# Patient Record
Sex: FEMALE | Race: WHITE | NOT HISPANIC OR LATINO | Employment: FULL TIME | URBAN - NONMETROPOLITAN AREA
[De-identification: names, ages, dates, MRNs, and addresses within clinical notes are randomized per-mention and may not be internally consistent; named-entity substitution may affect disease eponyms.]

---

## 2017-03-07 ENCOUNTER — HOSPITAL ENCOUNTER (EMERGENCY)
Facility: HOSPITAL | Age: 37
Discharge: HOME | End: 2017-03-07
Attending: EMERGENCY MEDICINE | Admitting: EMERGENCY MEDICINE

## 2017-03-07 VITALS
OXYGEN SATURATION: 99 % | SYSTOLIC BLOOD PRESSURE: 130 MMHG | DIASTOLIC BLOOD PRESSURE: 95 MMHG | RESPIRATION RATE: 19 BRPM | WEIGHT: 210 LBS | TEMPERATURE: 97.8 F | HEART RATE: 75 BPM | BODY MASS INDEX: 31.1 KG/M2 | HEIGHT: 69 IN

## 2017-03-07 DIAGNOSIS — R19.7 DIARRHEA, UNSPECIFIED TYPE: Primary | ICD-10-CM

## 2017-03-07 PROCEDURE — 99282 EMERGENCY DEPT VISIT SF MDM: CPT

## 2017-03-08 NOTE — ED PROVIDER NOTES
Subjective   History of Present Illness   36-year-old female with a remote history of a bladder cancer status post resection presenting with 1 week of diarrhea.  Patient states that she had frequent loose stools of the last week that resolved until last night when she took a dose of probiotics.  Then, this morning she started having loose stools again.  Had 7-8 loose stools during the day with no associated fever, chills, nausea, vomiting, abdominal pain.  Denies any recent travel, well water, antibiotics use, hospitalizations, immunotherapy.  Has taken intermittent doses of Imodium throughout the course of this illness.  Tolerating fluids without difficulty.    Review of Systems   Gastrointestinal: Positive for diarrhea.   All other systems reviewed and are negative.      Past Medical History   Diagnosis Date   • ADHD (attention deficit hyperactivity disorder)    • FHx: chemotherapy    • Gallbladder cancer        Allergies   Allergen Reactions   • Ciprofloxacin Rash   • Penicillins Rash       Past Surgical History   Procedure Laterality Date   • Liver resection     •  section     • Cholecystectomy     • Portacath placement         History reviewed. No pertinent family history.    Social History     Social History   • Marital status:      Spouse name: N/A   • Number of children: N/A   • Years of education: N/A     Social History Main Topics   • Smoking status: Never Smoker   • Smokeless tobacco: None   • Alcohol use No   • Drug use: No   • Sexual activity: Defer     Other Topics Concern   • None     Social History Narrative   • None           Objective   Physical Exam   Constitutional: She is oriented to person, place, and time. She appears well-nourished. No distress.   HENT:   Head: Normocephalic.   Mouth/Throat: Oropharynx is clear and moist. No oropharyngeal exudate.   Eyes: Conjunctivae are normal. No scleral icterus.   Neck: Neck supple. No tracheal deviation present.   Cardiovascular: Normal  rate, regular rhythm, normal heart sounds and intact distal pulses.  Exam reveals no gallop and no friction rub.    No murmur heard.  Pulmonary/Chest: Effort normal and breath sounds normal. No stridor. No respiratory distress. She has no wheezes. She has no rales. She exhibits no tenderness.   Abdominal: Soft. She exhibits no distension and no mass. There is no tenderness. There is no rebound and no guarding. No hernia.   Musculoskeletal: She exhibits no edema or deformity.   Neurological: She is alert and oriented to person, place, and time.   Skin: Skin is warm and dry. She is not diaphoretic. No erythema. No pallor.   Psychiatric: She has a normal mood and affect. Her behavior is normal.   Nursing note and vitals reviewed.      Procedures         ED Course  ED Course                  MDM   36-year-old female here with 1 week of diarrhea.  Afebrile, vital signs stable.  Abdomen is benign.  She does not appear dehydrated nor ill. Very low suspicion for Clostridium difficile or acute surgical process. Discussed use of Imodium more regularly, follow up with primary care provider for reevaluation, and strict return to care precautions if she is unable to tolerate by mouth fluid.      Final diagnoses:   Diarrhea, unspecified type            Jordin Moffett MD  03/07/17 5738

## 2017-03-08 NOTE — DISCHARGE INSTRUCTIONS
Please restart Imodium.  Come back to the emergency department if you're unable to keep down any fluids, you feel abdominal pain, or you have other concerns.

## 2017-03-11 ENCOUNTER — HOSPITAL ENCOUNTER (EMERGENCY)
Facility: HOSPITAL | Age: 37
Discharge: LEFT BEFORE DISCHARGE | End: 2017-03-11

## 2017-03-11 VITALS
HEART RATE: 94 BPM | HEIGHT: 69 IN | SYSTOLIC BLOOD PRESSURE: 140 MMHG | TEMPERATURE: 97.4 F | BODY MASS INDEX: 30.51 KG/M2 | RESPIRATION RATE: 18 BRPM | OXYGEN SATURATION: 100 % | WEIGHT: 206 LBS | DIASTOLIC BLOOD PRESSURE: 98 MMHG

## 2017-03-11 PROCEDURE — 99201: CPT

## 2017-04-12 ENCOUNTER — APPOINTMENT (OUTPATIENT)
Dept: GENERAL RADIOLOGY | Facility: HOSPITAL | Age: 37
End: 2017-04-12

## 2017-04-12 ENCOUNTER — HOSPITAL ENCOUNTER (EMERGENCY)
Facility: HOSPITAL | Age: 37
Discharge: HOME | End: 2017-04-12
Attending: EMERGENCY MEDICINE | Admitting: EMERGENCY MEDICINE

## 2017-04-12 VITALS
SYSTOLIC BLOOD PRESSURE: 135 MMHG | DIASTOLIC BLOOD PRESSURE: 96 MMHG | TEMPERATURE: 97.7 F | BODY MASS INDEX: 30.31 KG/M2 | RESPIRATION RATE: 18 BRPM | OXYGEN SATURATION: 100 % | HEIGHT: 68 IN | WEIGHT: 200 LBS | HEART RATE: 80 BPM

## 2017-04-12 DIAGNOSIS — S92.901A FOOT FRACTURE, RIGHT, CLOSED, INITIAL ENCOUNTER: Primary | ICD-10-CM

## 2017-04-12 PROCEDURE — 73630 X-RAY EXAM OF FOOT: CPT

## 2017-04-12 PROCEDURE — 99283 EMERGENCY DEPT VISIT LOW MDM: CPT

## 2017-04-12 PROCEDURE — 73610 X-RAY EXAM OF ANKLE: CPT

## 2017-04-12 RX ORDER — IBUPROFEN 600 MG/1
600 TABLET ORAL EVERY 8 HOURS PRN
Qty: 12 TABLET | Refills: 0 | Status: SHIPPED | OUTPATIENT
Start: 2017-04-12 | End: 2018-05-14

## 2017-04-12 RX ORDER — ACETAMINOPHEN 325 MG/1
650 TABLET ORAL ONCE
Status: COMPLETED | OUTPATIENT
Start: 2017-04-12 | End: 2017-04-12

## 2017-04-12 RX ORDER — HYDROCODONE BITARTRATE AND ACETAMINOPHEN 5; 325 MG/1; MG/1
1 TABLET ORAL EVERY 6 HOURS PRN
Qty: 12 TABLET | Refills: 0 | Status: SHIPPED | OUTPATIENT
Start: 2017-04-12 | End: 2018-05-03

## 2017-04-12 RX ORDER — IBUPROFEN 600 MG/1
600 TABLET ORAL ONCE
Status: COMPLETED | OUTPATIENT
Start: 2017-04-12 | End: 2017-04-12

## 2017-04-12 RX ADMIN — IBUPROFEN 600 MG: 600 TABLET, FILM COATED ORAL at 10:41

## 2017-04-12 RX ADMIN — ACETAMINOPHEN 650 MG: 325 TABLET, FILM COATED ORAL at 10:41

## 2017-04-12 NOTE — ED PROVIDER NOTES
Subjective   HPI Comments: Patient here with complaint of right foot pain twisted earlier today describes injuring it 5 days ago as well no numbness tingling no other injuries presents here for further evaluation localized pain right foot      Review of Systems   Constitutional: Negative.    HENT: Negative.    Gastrointestinal: Negative.    Musculoskeletal: Positive for arthralgias.   Skin: Negative.    Neurological: Negative.    Psychiatric/Behavioral: Negative.    All other systems reviewed and are negative.      Past Medical History:   Diagnosis Date   • ADHD (attention deficit hyperactivity disorder)    • FHx: chemotherapy    • Gallbladder cancer        Allergies   Allergen Reactions   • Ciprofloxacin Rash   • Penicillins Rash       Past Surgical History:   Procedure Laterality Date   •  SECTION     • CHOLECYSTECTOMY     • LIVER RESECTION     • PORTACATH PLACEMENT         History reviewed. No pertinent family history.    Social History     Social History   • Marital status:      Spouse name: N/A   • Number of children: N/A   • Years of education: N/A     Social History Main Topics   • Smoking status: Former Smoker   • Smokeless tobacco: None      Comment: E-cig, quit smoking 10 yr ago.   • Alcohol use No   • Drug use: No   • Sexual activity: Defer     Other Topics Concern   • None     Social History Narrative   • None           Objective   Physical Exam   Constitutional: She is oriented to person, place, and time. She appears well-developed and well-nourished.   Afebrile vital signs stable nontoxic well-appearing   HENT:   Head: Normocephalic.   Eyes: Pupils are equal, round, and reactive to light.   Neck: Neck supple.   Cardiovascular: Normal rate and intact distal pulses.    Pulmonary/Chest: Effort normal.   Musculoskeletal: She exhibits no deformity.   Tenderness dorsal lateral aspect right foot slight tenderness lateral malleolus no deformity no bruising no vascular intact   Neurological: She  is alert and oriented to person, place, and time. No cranial nerve deficit.   Skin: Skin is warm and dry. No rash noted.   Psychiatric: She has a normal mood and affect. Her behavior is normal.   Nursing note and vitals reviewed.      Splint Application  Date/Time: 4/12/2017 11:10 AM  Performed by: KEVIN AGUILAR  Authorized by: KWASI GRAHAM   Consent: Verbal consent obtained.  Consent given by: patient  Patient identity confirmed: verbally with patient  Location details: right ankle  Splint type: cast boot.  Post-procedure: The splinted body part was neurovascularly unchanged following the procedure.  Patient tolerance: Patient tolerated the procedure well with no immediate complications               ED Course  ED Course   Comment By Time   Will place patient and walking cast boot until orthopedic follow-up Kevin Aguilar PA-C 04/12 1105                  [facility]    Final diagnoses:   Foot fracture, right, closed, initial encounter            Kevin Aguilar PA-C  04/12/17 1111

## 2017-04-13 ENCOUNTER — OFFICE VISIT (OUTPATIENT)
Dept: ORTHOPEDIC SURGERY | Facility: CLINIC | Age: 37
End: 2017-04-13

## 2017-04-13 VITALS — BODY MASS INDEX: 30.31 KG/M2 | HEIGHT: 68 IN | WEIGHT: 200 LBS | RESPIRATION RATE: 18 BRPM

## 2017-04-13 DIAGNOSIS — S92.354A CLOSED NONDISPLACED FRACTURE OF FIFTH METATARSAL BONE OF RIGHT FOOT, INITIAL ENCOUNTER: Primary | ICD-10-CM

## 2017-04-13 PROCEDURE — 99204 OFFICE O/P NEW MOD 45 MIN: CPT | Performed by: PODIATRIST

## 2017-04-13 RX ORDER — ALPRAZOLAM 0.5 MG/1
TABLET ORAL
Refills: 0 | COMMUNITY
Start: 2017-04-12 | End: 2018-05-03

## 2017-04-13 RX ORDER — OSELTAMIVIR PHOSPHATE 75 MG/1
CAPSULE ORAL
Refills: 0 | COMMUNITY
Start: 2017-03-16 | End: 2018-05-03

## 2017-04-13 RX ORDER — METHYLPHENIDATE HYDROCHLORIDE 20 MG/1
TABLET ORAL
Refills: 0 | COMMUNITY
Start: 2017-03-16 | End: 2018-05-03

## 2017-04-13 RX ORDER — FUROSEMIDE 20 MG/1
TABLET ORAL
Refills: 0 | COMMUNITY
Start: 2017-04-11

## 2017-04-13 RX ORDER — HYDROCODONE BITARTRATE AND ACETAMINOPHEN 5; 325 MG/1; MG/1
1 TABLET ORAL EVERY 6 HOURS PRN
Qty: 20 TABLET | Refills: 0 | Status: SHIPPED | OUTPATIENT
Start: 2017-04-13 | End: 2018-05-03

## 2017-04-13 RX ORDER — BUPROPION HYDROCHLORIDE 300 MG/1
300 TABLET ORAL EVERY MORNING
Refills: 0 | COMMUNITY
Start: 2017-02-10

## 2017-04-13 NOTE — PROGRESS NOTES
Subjective   Patient ID: Pao Modi is a 36 y.o. female   Pain of the Right Foot  she comes in today after being seen in the ed for a roberts fracture of the right foot she says.  She says she tripped at home the other day and heard something crack or pop. She says she think she hurt it the weekend before this as well.  History of Present Illness    Review of Systems   Constitutional: Negative for fever.   HENT: Negative for voice change.    Eyes: Negative for visual disturbance.   Respiratory: Negative for shortness of breath.    Cardiovascular: Negative for chest pain.   Gastrointestinal: Negative for abdominal distention and abdominal pain.   Genitourinary: Negative for dysuria.   Musculoskeletal: Positive for arthralgias. Negative for gait problem and joint swelling.   Skin: Negative for rash.   Neurological: Negative for speech difficulty.   Hematological: Does not bruise/bleed easily.   Psychiatric/Behavioral: Negative for confusion.       Past Medical History:   Diagnosis Date   • ADHD (attention deficit hyperactivity disorder)    • FHx: chemotherapy    • Gallbladder cancer         Past Surgical History:   Procedure Laterality Date   •  SECTION     • CHOLECYSTECTOMY     • LIVER RESECTION     • PORTACATH PLACEMENT         Allergies   Allergen Reactions   • Ciprofloxacin Rash   • Penicillins Rash       @FH/ reviewed@    Objective   Physical Exam   Constitutional: She is oriented to person, place, and time. She appears well-developed and well-nourished.   HENT:   Head: Normocephalic and atraumatic.   Eyes: EOM are normal. Pupils are equal, round, and reactive to light.   Neck: Normal range of motion.   Cardiovascular: Normal rate.    Pulmonary/Chest: Effort normal.   Abdominal: Soft. Bowel sounds are normal.   Musculoskeletal: Normal range of motion.   Neurological: She is alert and oriented to person, place, and time. She has normal reflexes.   Skin: Skin is warm.   Psychiatric: She has a normal  mood and affect. Her behavior is normal. Judgment and thought content normal.     Ortho Exam  Ortho Exam   Right Lower extremity exam:  Vascular: Pedal pulses are palpable, pedal hair is noted, edema is noted to the lateral right foot.  Neuro: Gross sensation and reflexes are normal and intact  Derm: There is edema, erythema, ecchymosis over the dorsal lateral foot in the area of the fifth metatarsal base.  MSK: She is tender to palpation along the lateral aspect of the foot especially along the proximal fifth metatarsal.    Assessment/Plan  fifth metatarsal fracture foot right  Independent Review of Radiographic Studies:    I reviewed her x-rays from the emergency department which show a transverse fracture of the fifth metatarsal at the proximal shaft.  It's just past the metaphyseal diaphyseal junction and to me is just too distal to be called a true Montoya fracture.  The fracture also does not seem to bridge all the way across the medial cortex.  It's otherwise nondisplaced.  Laboratory and Other Studies:     Medical Decision Making:        Procedures  [] No procedures were performed in office today.    Pao was seen today for pain.    Diagnoses and all orders for this visit:    Closed nondisplaced fracture of fifth metatarsal bone of right foot, initial encounter    Other orders  -     HYDROcodone-acetaminophen (NORCO) 5-325 MG per tablet; Take 1 tablet by mouth Every 6 (Six) Hours As Needed (pain).        Recommendations/Plan:  I discussed with her the fracture as well as some it's potential difficulty with healing.  I explained though that due to it not being completely through both cortices and its alignment as well as in my personal opinion it not being a true Montoya fracture I think we may be okay treating it him in just a boot.  I did discuss with her ever though that it could result in delayed or nonunion and she could potentially need surgery later.  For now recommend a Rice to help with her edema.  She  can weight-bear as tolerated in the boot and use her crutches as needed.  Her oldest son is getting  this weekend and I advised her not to be up on it too much for that.  At her request I gave her a a few additional Norco but advised her that since she just From the Emergency Department Yesterday That May Not Fill It until Those Are Finished.  I'll See Her Back in about 2 or 3 Weeks to Monitor X-Rays.    Return in about 2 weeks (around 4/27/2017), or xoa.  Patient agreeable to call or return sooner for any concerns.

## 2017-04-26 DIAGNOSIS — Z09 FOLLOW-UP EXAM: Primary | ICD-10-CM

## 2017-04-27 ENCOUNTER — OFFICE VISIT (OUTPATIENT)
Dept: ORTHOPEDIC SURGERY | Facility: CLINIC | Age: 37
End: 2017-04-27

## 2017-04-27 VITALS — BODY MASS INDEX: 30.31 KG/M2 | HEIGHT: 68 IN | WEIGHT: 200 LBS | RESPIRATION RATE: 16 BRPM

## 2017-04-27 DIAGNOSIS — S92.354A CLOSED NONDISPLACED FRACTURE OF FIFTH METATARSAL BONE OF RIGHT FOOT, INITIAL ENCOUNTER: Primary | ICD-10-CM

## 2017-04-27 PROCEDURE — 99213 OFFICE O/P EST LOW 20 MIN: CPT | Performed by: PODIATRIST

## 2017-04-27 NOTE — PROGRESS NOTES
Subjective   Patient ID: Pao Modi is a 36 y.o. female   No chief complaint on file.  Comes back in today in her boot and 4 follow-up for her right foot fracture.  She states is starting to feel better but she's also says she's been walking on it at home some without the boot.  She says she's been using some sports tape and some compression and pressure on it makes it feel better.    History of Present Illness    Review of Systems   Constitutional: Negative for diaphoresis, fever and unexpected weight change.   HENT: Negative for dental problem and sore throat.    Eyes: Negative for visual disturbance.   Respiratory: Negative for shortness of breath.    Cardiovascular: Negative for chest pain.   Gastrointestinal: Negative for abdominal pain, constipation, diarrhea, nausea and vomiting.   Genitourinary: Negative for difficulty urinating and frequency.   Neurological: Negative for headaches.   Hematological: Does not bruise/bleed easily.   All other systems reviewed and are negative.      Past Medical History:   Diagnosis Date   • ADHD (attention deficit hyperactivity disorder)    • FHx: chemotherapy    • Gallbladder cancer         Past Surgical History:   Procedure Laterality Date   •  SECTION     • CHOLECYSTECTOMY     • LIVER RESECTION     • PORTACATH PLACEMENT         Allergies   Allergen Reactions   • Ciprofloxacin Rash   • Penicillins Rash         Objective   Physical Exam   Constitutional: She is oriented to person, place, and time. She appears well-developed and well-nourished.   HENT:   Head: Normocephalic and atraumatic.   Eyes: EOM are normal. Pupils are equal, round, and reactive to light.   Neck: Normal range of motion.   Cardiovascular: Normal rate.    Pulmonary/Chest: Effort normal.   Abdominal: Soft. Bowel sounds are normal.   Musculoskeletal: Normal range of motion.   Neurological: She is alert and oriented to person, place, and time. She has normal reflexes.   Skin: Skin is warm.    Psychiatric: She has a normal mood and affect. Her behavior is normal. Judgment and thought content normal.     Ortho Exam  Ortho Exam  Right Lower extremity exam:  Vascular: Pulses are palpable, pedal hair is noted, capillary refill time is brisk, there is mild edema to the right foot.  Neuro: Gross sensations intact.  Reflexes intact.  Derm: Mild bruising and ecchymosis to the right lateral foot.  MSK: She still maintains a cavovarus foot type and a C-shaped foot.  She  to palpation along the fifth metatarsal base.    Assessment/Plan right fifth metatarsal Montoya fracture.  Independent Review of Radiographic Studies:      Laboratory and Other Studies:     Medical Decision Making:        Procedures  [] No procedures were performed in office today.    There are no diagnoses linked to this encounter.      Recommendations/Plan:  I reviewed her x-rays and discussed the findings with her.  Explained that there does appear to be some callus forming across the fracture but is still not healed.  She needs to continue weightbearing in the boot.  Continue Rice as needed.  She can use her physical therapy tape as she wishes.  I cautioned her against walking around without the boot and explained she could reinjure this and potentially result in needing surgery.  I'll see her back in 2 or 3 weeks for follow-up x-rays and maybe at that point we'll consider transition to some partial shoe wear and the house.    No Follow-up on file.  Patient agreeable to call or return sooner for any concerns.

## 2017-05-12 DIAGNOSIS — Z09 FOLLOW-UP EXAM: Primary | ICD-10-CM

## 2017-05-16 ENCOUNTER — OFFICE VISIT (OUTPATIENT)
Dept: ORTHOPEDIC SURGERY | Facility: CLINIC | Age: 37
End: 2017-05-16

## 2017-05-16 VITALS — RESPIRATION RATE: 18 BRPM | BODY MASS INDEX: 30.31 KG/M2 | WEIGHT: 200 LBS | HEIGHT: 68 IN

## 2017-05-16 DIAGNOSIS — S92.351D CLOSED DISPLACED FRACTURE OF FIFTH METATARSAL BONE OF RIGHT FOOT WITH ROUTINE HEALING, SUBSEQUENT ENCOUNTER: Primary | ICD-10-CM

## 2017-05-16 PROCEDURE — 99213 OFFICE O/P EST LOW 20 MIN: CPT | Performed by: PODIATRIST

## 2017-06-07 DIAGNOSIS — Z09 FOLLOW-UP EXAM: Primary | ICD-10-CM

## 2017-06-09 DIAGNOSIS — Z09 FOLLOW-UP EXAM: Primary | ICD-10-CM

## 2017-06-13 ENCOUNTER — OFFICE VISIT (OUTPATIENT)
Dept: ORTHOPEDIC SURGERY | Facility: CLINIC | Age: 37
End: 2017-06-13

## 2017-06-13 VITALS — WEIGHT: 209 LBS | HEIGHT: 68 IN | BODY MASS INDEX: 31.67 KG/M2 | RESPIRATION RATE: 16 BRPM

## 2017-06-13 DIAGNOSIS — S92.351D CLOSED DISPLACED FRACTURE OF FIFTH METATARSAL BONE OF RIGHT FOOT WITH ROUTINE HEALING, SUBSEQUENT ENCOUNTER: Primary | ICD-10-CM

## 2017-06-13 PROCEDURE — 99212 OFFICE O/P EST SF 10 MIN: CPT | Performed by: PODIATRIST

## 2017-06-13 RX ORDER — SERTRALINE HYDROCHLORIDE 100 MG/1
TABLET, FILM COATED ORAL
Refills: 0 | COMMUNITY
Start: 2017-05-23 | End: 2018-05-03

## 2017-06-13 NOTE — PROGRESS NOTES
Subjective   Patient ID: Pao Modi is a 36 y.o. female   Follow-up of the Right Foot  She comes in today for follow-up of her right foot fracture.  She complains of no pain.  She is wearing flat sandals.    History of Present Illness    Review of Systems   Constitutional: Negative for diaphoresis, fever and unexpected weight change.   HENT: Negative for dental problem and sore throat.    Eyes: Negative for visual disturbance.   Respiratory: Negative for shortness of breath.    Cardiovascular: Negative for chest pain.   Gastrointestinal: Negative for abdominal pain, constipation, diarrhea, nausea and vomiting.   Genitourinary: Negative for difficulty urinating and frequency.   Musculoskeletal: Positive for arthralgias.   Neurological: Negative for headaches.   Hematological: Does not bruise/bleed easily.   All other systems reviewed and are negative.      Past Medical History:   Diagnosis Date   • ADHD (attention deficit hyperactivity disorder)    • FHx: chemotherapy    • Gallbladder cancer         Past Surgical History:   Procedure Laterality Date   •  SECTION     • CHOLECYSTECTOMY     • LIVER RESECTION     • PORTACATH PLACEMENT         Allergies   Allergen Reactions   • Ciprofloxacin Rash   • Penicillins Rash       History reviewed. No pertinent family history.    Objective   Physical Exam   Constitutional: She is oriented to person, place, and time. She appears well-developed and well-nourished.   HENT:   Head: Normocephalic and atraumatic.   Eyes: EOM are normal. Pupils are equal, round, and reactive to light.   Neck: Normal range of motion.   Cardiovascular: Normal rate.    Pulmonary/Chest: Effort normal.   Abdominal: Soft. Bowel sounds are normal.   Musculoskeletal: Normal range of motion.   Neurological: She is alert and oriented to person, place, and time. She has normal reflexes.   Skin: Skin is warm.   Psychiatric: She has a normal mood and affect. Her behavior is normal. Judgment and thought  content normal.     Ortho Exam  Ortho Exam  Right lower extremity exam shows that she's neurovascularly intact.  There is no pain to palpation over the fracture site.  No pain with range of motion of the midfoot.  No tenderness plantarly.  Manual muscle testing is normal.  Assessment/Plan healing right foot fifth metatarsal Montoya fracture  Independent Review of Radiographic Studies:      Laboratory and Other Studies:     Medical Decision Making:        Procedures  [] No procedures were performed in office today.    Pao was seen today for follow-up.    Diagnoses and all orders for this visit:    Closed displaced fracture of fifth metatarsal bone of right foot with routine healing, subsequent encounter        Recommendations/Plan:  She can resume activities tolerated.  I explained her that if she is wearing shoes like that it's not bothering her the most likely she'll be fine.  If she has any further issues she can let me know otherwise follow up when necessary    Return if symptoms worsen or fail to improve.  Patient agreeable to call or return sooner for any concerns.

## 2017-07-18 ENCOUNTER — TRANSCRIBE ORDERS (OUTPATIENT)
Dept: MAMMOGRAPHY | Facility: HOSPITAL | Age: 37
End: 2017-07-18

## 2017-07-18 DIAGNOSIS — Z13.9 SCREENING: Primary | ICD-10-CM

## 2017-08-03 ENCOUNTER — HOSPITAL ENCOUNTER (OUTPATIENT)
Dept: MAMMOGRAPHY | Facility: HOSPITAL | Age: 37
Discharge: HOME | End: 2017-08-03
Admitting: NURSE PRACTITIONER

## 2017-08-03 DIAGNOSIS — Z13.9 SCREENING: ICD-10-CM

## 2017-08-03 PROCEDURE — 77063 BREAST TOMOSYNTHESIS BI: CPT

## 2017-08-03 PROCEDURE — G0202 SCR MAMMO BI INCL CAD: HCPCS

## 2018-05-03 ENCOUNTER — OFFICE VISIT (OUTPATIENT)
Dept: SURGERY | Facility: CLINIC | Age: 38
End: 2018-05-03

## 2018-05-03 VITALS
HEIGHT: 68 IN | HEART RATE: 79 BPM | TEMPERATURE: 97.9 F | DIASTOLIC BLOOD PRESSURE: 86 MMHG | BODY MASS INDEX: 33.34 KG/M2 | OXYGEN SATURATION: 99 % | WEIGHT: 220 LBS | SYSTOLIC BLOOD PRESSURE: 128 MMHG

## 2018-05-03 DIAGNOSIS — Z12.11 SCREENING FOR COLON CANCER: Primary | ICD-10-CM

## 2018-05-03 PROCEDURE — 99203 OFFICE O/P NEW LOW 30 MIN: CPT | Performed by: SURGERY

## 2018-05-03 RX ORDER — BISACODYL 5 MG/1
TABLET, DELAYED RELEASE ORAL
Qty: 4 TABLET | Refills: 0 | Status: SHIPPED | OUTPATIENT
Start: 2018-05-03 | End: 2018-05-18 | Stop reason: HOSPADM

## 2018-05-03 RX ORDER — TOPIRAMATE 25 MG/1
25 CAPSULE, COATED PELLETS ORAL 2 TIMES DAILY
COMMUNITY

## 2018-05-03 RX ORDER — SODIUM CHLORIDE, SODIUM LACTATE, POTASSIUM CHLORIDE, CALCIUM CHLORIDE 600; 310; 30; 20 MG/100ML; MG/100ML; MG/100ML; MG/100ML
50 INJECTION, SOLUTION INTRAVENOUS CONTINUOUS
Status: CANCELLED | OUTPATIENT
Start: 2018-05-03

## 2018-05-03 RX ORDER — POLYETHYLENE GLYCOL 3350 17 G/17G
238 POWDER, FOR SOLUTION ORAL ONCE
Qty: 238 G | Refills: 0 | Status: SHIPPED | OUTPATIENT
Start: 2018-05-03 | End: 2018-05-03

## 2018-05-03 RX ORDER — CLONAZEPAM 0.5 MG/1
0.5 TABLET ORAL 2 TIMES DAILY PRN
COMMUNITY

## 2018-05-03 NOTE — PROGRESS NOTES
Subjective   Pao Modi is a 37 y.o. female.   Chief Complaint   Patient presents with   • Colonoscopy     hx of gallbladder cancer     P.   History of Present Illness   Pao Modi is a 37-year-old female patient referred for screening colonoscopy evaluation.  She has a family history of anal cancer in her father and was advised to have routine screening on an every 5 year basis.  Her last exam was 5 years ago and was normal.  She is doing well and has no complaints related to her bowel habits.  She denies rectal pain, rectal bleeding, diarrhea, constipation, and abdominal pain.  Personally, she has a history of gallbladder cancer which was found incidentally at the time of cholecystectomy.  She was treated with chemotherapy for 3 months prior to undergoing a definitive liver resection with what sounds like a hepaticoejunostomy and lymphadenectomy at the [facility].  She has subsequently done quite well.      There is no problem list on file for this patient.      Past Medical History:   Diagnosis Date   • ADHD (attention deficit hyperactivity disorder)    • Depression    • Gallbladder cancer     found incidentally at the time of cholecystectomy.  Treated with chemo x 3 months prior to undergoing definitive liver resection with hepaticojejunoscopy and lymphadenectomy. treated at .    • Hx antineoplastic chemo    • Hyperlipidemia    • Panic disorder        Past Surgical History:   Procedure Laterality Date   •  SECTION     • CHOLECYSTECTOMY WITH COMMON BILE DUCT EXPLORATION  10/2014   • LIVER RESECTION      for GB cancer, has hepaticojejunostomy   • PORTACATH PLACEMENT     • TUBAL ABDOMINAL LIGATION     • VENOUS ACCESS DEVICE (PORT) REMOVAL         Medications:     Current Outpatient Prescriptions:   •  buPROPion XL (WELLBUTRIN XL) 300 MG 24 hr tablet, , Disp: , Rfl: 0  •  clonazePAM (KlonoPIN) 0.5 MG tablet, Take 0.5 mg by mouth 2 (Two) Times a Day As Needed for Seizures., Disp: , Rfl:    •  furosemide (LASIX) 20 MG tablet, take 1 tablet by mouth once daily if needed, Disp: , Rfl: 0  •  ibuprofen (ADVIL,MOTRIN) 600 MG tablet, Take 1 tablet by mouth Every 8 (Eight) Hours As Needed for Mild Pain (1-3)., Disp: 12 tablet, Rfl: 0  •  topiramate (TOPAMAX) 25 MG capsule, Take 25 mg by mouth 2 (Two) Times a Day., Disp: , Rfl:     Allergies:   Allergies   Allergen Reactions   • Ciprofloxacin Rash   • Penicillins Rash         Family History   Problem Relation Age of Onset   • Breast cancer Sister      DCIS   • Heart failure Mother       at 74   • Heart failure Father      treated fro SCC anal canal.  at 80.        Social History     Social History   • Marital status:      Social History Main Topics   • Smoking status: Former Smoker   • Smokeless tobacco: Never Used      Comment: E-cig, quit smoking 10 yr ago.   • Alcohol use No   • Drug use: No   • Sexual activity: Defer     Other Topics Concern   • Not on file         Review of Systems   Constitutional: Negative for chills, fever and unexpected weight change.   HENT: Negative for hearing loss, trouble swallowing and voice change.    Eyes: Negative for visual disturbance.   Respiratory: Negative for apnea, cough, chest tightness, shortness of breath and wheezing.    Cardiovascular: Negative for chest pain, palpitations and leg swelling.   Gastrointestinal: Negative for abdominal distention, abdominal pain, anal bleeding, blood in stool, constipation, diarrhea, nausea, rectal pain and vomiting.   Endocrine: Negative for cold intolerance and heat intolerance.   Genitourinary: Negative for difficulty urinating, dysuria and flank pain.   Musculoskeletal: Negative for back pain and gait problem.   Skin: Negative for color change, rash and wound.   Neurological: Negative for dizziness, syncope, speech difficulty, weakness, light-headedness, numbness and headaches.   Hematological: Negative for adenopathy. Does not bruise/bleed easily.  "  Psychiatric/Behavioral: Negative for confusion. The patient is not nervous/anxious.        Objective    /86   Pulse 79   Temp 97.9 °F (36.6 °C) (Temporal Artery )   Ht 172.7 cm (67.99\")   Wt 99.8 kg (220 lb)   SpO2 99%   BMI 33.46 kg/m²     Physical Exam   Constitutional: She is oriented to person, place, and time. She appears well-developed and well-nourished.   HENT:   Head: Normocephalic and atraumatic.   Eyes: No scleral icterus.   Neck: Neck supple.   Cardiovascular: Regular rhythm.    Pulmonary/Chest: Effort normal.   Abdominal: Soft. She exhibits no distension. There is no tenderness.   Well-healed upper abdominal chevron incision noted.   Neurological: She is alert and oriented to person, place, and time.   Skin: Skin is warm and dry.   Psychiatric: She has a normal mood and affect. Her behavior is normal.     Outside Records:  I have taken the opportunity to review the patient's available outside records in paper format, scanned format and those available on Care Everywhere    Results Review:  I have reviewed the entirety of the patient's clinical lab results.  I have also personally reviewed the relevant patient imaging.         Assessment/Plan   Pao was seen today for colonoscopy.    Diagnoses and all orders for this visit:    Screening for colon cancer  -     Case Request; Standing  -     lactated ringers infusion; Infuse 50 mL/hr into a venous catheter Continuous.  -     Case Request    Other orders  -     Follow Anesthesia Guidelines / Standing Orders; Future  -     Provide NPO Instructions to Patient  -     Follow Anesthesia Guidelines / Standing Orders; Standing  -     Verify NPO Status; Standing  -     Obtain Informed Consent; Standing  -     Verify Bowel Prep Was Successful; Standing  -     Give Tap Water Enema If Bowel Prep Insufficient; Standing  -     Notify Physician - Standard; Standing  -     polyethylene glycol (MIRALAX) powder; Take 238 g by mouth 1 (One) Time for 1 dose. " Mix 238 grams of miralax with 64 oz garotade and drink at 4 pm day before colonoscopy  -     bisacodyl (DULCOLAX) 5 MG EC tablet; Take 4 tablets once by mouth at 1 pm on day before colonoscopy. (Patient taking differently: Take 20 mg by mouth. Take 4 tablets once by mouth at 1 pm on day before colonoscopy.  )        We discussed colonoscopy for colon cancer screening purposes.  We discussed the indications for screening colonoscopy as well as the risks, benefits and alternatives to this procedure. Risks including but not limited to perforation, bleeding,need for blood transfusion or emergent surgery ,and missed neoplasm were reviewed in detail with the patient. The necessary bowel preparation and pre-procedure clear liquid diet was explained in detail.  A written instructional handout was also provided.  Electronic prescriptions for miralax and dulcolax were sent to the patient's pharmacy.  The patient was given an opportunity to ask questions.  The patient verbalized understanding of these recommendations and the plan of care. The patient is willing to proceed with colonoscopy and has signed informed consent in the office today.  My office will arrange scheduling for the colonoscopy procedure and pre-admission testing.

## 2018-05-18 ENCOUNTER — ANESTHESIA EVENT (OUTPATIENT)
Dept: GASTROENTEROLOGY | Facility: HOSPITAL | Age: 38
End: 2018-05-18

## 2018-05-18 ENCOUNTER — HOSPITAL ENCOUNTER (OUTPATIENT)
Facility: HOSPITAL | Age: 38
Setting detail: HOSPITAL OUTPATIENT SURGERY
Discharge: HOME | End: 2018-05-18
Attending: SURGERY | Admitting: SURGERY

## 2018-05-18 ENCOUNTER — ANESTHESIA (OUTPATIENT)
Dept: GASTROENTEROLOGY | Facility: HOSPITAL | Age: 38
End: 2018-05-18

## 2018-05-18 VITALS
HEIGHT: 68 IN | HEART RATE: 67 BPM | DIASTOLIC BLOOD PRESSURE: 72 MMHG | WEIGHT: 210 LBS | RESPIRATION RATE: 20 BRPM | SYSTOLIC BLOOD PRESSURE: 116 MMHG | OXYGEN SATURATION: 95 % | BODY MASS INDEX: 31.83 KG/M2 | TEMPERATURE: 97.6 F

## 2018-05-18 DIAGNOSIS — Z12.11 SCREENING FOR COLON CANCER: ICD-10-CM

## 2018-05-18 LAB — HCG SERPL QL: NEGATIVE

## 2018-05-18 PROCEDURE — 25010000002 FENTANYL CITRATE (PF) 100 MCG/2ML SOLUTION: Performed by: NURSE ANESTHETIST, CERTIFIED REGISTERED

## 2018-05-18 PROCEDURE — G0105 COLORECTAL SCRN; HI RISK IND: HCPCS | Performed by: SURGERY

## 2018-05-18 PROCEDURE — 84703 CHORIONIC GONADOTROPIN ASSAY: CPT | Performed by: NURSE ANESTHETIST, CERTIFIED REGISTERED

## 2018-05-18 PROCEDURE — 25010000002 PROPOFOL 1000 MG/ML EMULSION: Performed by: NURSE ANESTHETIST, CERTIFIED REGISTERED

## 2018-05-18 PROCEDURE — 25010000002 PROPOFOL 10 MG/ML EMULSION: Performed by: NURSE ANESTHETIST, CERTIFIED REGISTERED

## 2018-05-18 PROCEDURE — 25010000002 ONDANSETRON PER 1 MG: Performed by: NURSE ANESTHETIST, CERTIFIED REGISTERED

## 2018-05-18 PROCEDURE — 25010000002 MIDAZOLAM PER 1 MG: Performed by: NURSE ANESTHETIST, CERTIFIED REGISTERED

## 2018-05-18 RX ORDER — SODIUM CHLORIDE, SODIUM LACTATE, POTASSIUM CHLORIDE, CALCIUM CHLORIDE 600; 310; 30; 20 MG/100ML; MG/100ML; MG/100ML; MG/100ML
50 INJECTION, SOLUTION INTRAVENOUS CONTINUOUS
Status: DISCONTINUED | OUTPATIENT
Start: 2018-05-18 | End: 2018-05-18 | Stop reason: HOSPADM

## 2018-05-18 RX ORDER — PROPOFOL 10 MG/ML
VIAL (ML) INTRAVENOUS AS NEEDED
Status: DISCONTINUED | OUTPATIENT
Start: 2018-05-18 | End: 2018-05-18 | Stop reason: SURG

## 2018-05-18 RX ORDER — ONDANSETRON 2 MG/ML
INJECTION INTRAMUSCULAR; INTRAVENOUS AS NEEDED
Status: DISCONTINUED | OUTPATIENT
Start: 2018-05-18 | End: 2018-05-18 | Stop reason: SURG

## 2018-05-18 RX ORDER — FENTANYL CITRATE 50 UG/ML
INJECTION, SOLUTION INTRAMUSCULAR; INTRAVENOUS AS NEEDED
Status: DISCONTINUED | OUTPATIENT
Start: 2018-05-18 | End: 2018-05-18 | Stop reason: SURG

## 2018-05-18 RX ORDER — MIDAZOLAM HYDROCHLORIDE 1 MG/ML
INJECTION INTRAMUSCULAR; INTRAVENOUS AS NEEDED
Status: DISCONTINUED | OUTPATIENT
Start: 2018-05-18 | End: 2018-05-18 | Stop reason: SURG

## 2018-05-18 RX ADMIN — ONDANSETRON 4 MG: 2 INJECTION INTRAMUSCULAR; INTRAVENOUS at 14:12

## 2018-05-18 RX ADMIN — PROPOFOL 120 MCG/KG/MIN: 10 INJECTION, EMULSION INTRAVENOUS at 14:14

## 2018-05-18 RX ADMIN — LIDOCAINE HYDROCHLORIDE 80 MG: 20 INJECTION, SOLUTION INTRAVENOUS at 14:13

## 2018-05-18 RX ADMIN — MIDAZOLAM HYDROCHLORIDE 1 MG: 1 INJECTION, SOLUTION INTRAMUSCULAR; INTRAVENOUS at 14:12

## 2018-05-18 RX ADMIN — FENTANYL CITRATE 50 MCG: 50 INJECTION, SOLUTION INTRAMUSCULAR; INTRAVENOUS at 14:12

## 2018-05-18 RX ADMIN — PROPOFOL 50 MG: 10 INJECTION, EMULSION INTRAVENOUS at 14:13

## 2018-05-18 RX ADMIN — SODIUM CHLORIDE, POTASSIUM CHLORIDE, SODIUM LACTATE AND CALCIUM CHLORIDE: 600; 310; 30; 20 INJECTION, SOLUTION INTRAVENOUS at 14:27

## 2018-05-18 RX ADMIN — SODIUM CHLORIDE, POTASSIUM CHLORIDE, SODIUM LACTATE AND CALCIUM CHLORIDE 50 ML/HR: 600; 310; 30; 20 INJECTION, SOLUTION INTRAVENOUS at 12:04

## 2018-05-18 NOTE — H&P (VIEW-ONLY)
Subjective   Pao Modi is a 37 y.o. female.   Chief Complaint   Patient presents with   • Colonoscopy     hx of gallbladder cancer     P.   History of Present Illness   Pao Modi is a 37-year-old female patient referred for screening colonoscopy evaluation.  She has a family history of anal cancer in her father and was advised to have routine screening on an every 5 year basis.  Her last exam was 5 years ago and was normal.  She is doing well and has no complaints related to her bowel habits.  She denies rectal pain, rectal bleeding, diarrhea, constipation, and abdominal pain.  Personally, she has a history of gallbladder cancer which was found incidentally at the time of cholecystectomy.  She was treated with chemotherapy for 3 months prior to undergoing a definitive liver resection with what sounds like a hepaticoejunostomy and lymphadenectomy at the [facility].  She has subsequently done quite well.      There is no problem list on file for this patient.      Past Medical History:   Diagnosis Date   • ADHD (attention deficit hyperactivity disorder)    • Depression    • Gallbladder cancer     found incidentally at the time of cholecystectomy.  Treated with chemo x 3 months prior to undergoing definitive liver resection with hepaticojejunoscopy and lymphadenectomy. treated at .    • Hx antineoplastic chemo    • Hyperlipidemia    • Panic disorder        Past Surgical History:   Procedure Laterality Date   •  SECTION     • CHOLECYSTECTOMY WITH COMMON BILE DUCT EXPLORATION  10/2014   • LIVER RESECTION      for GB cancer, has hepaticojejunostomy   • PORTACATH PLACEMENT     • TUBAL ABDOMINAL LIGATION     • VENOUS ACCESS DEVICE (PORT) REMOVAL         Medications:     Current Outpatient Prescriptions:   •  buPROPion XL (WELLBUTRIN XL) 300 MG 24 hr tablet, , Disp: , Rfl: 0  •  clonazePAM (KlonoPIN) 0.5 MG tablet, Take 0.5 mg by mouth 2 (Two) Times a Day As Needed for Seizures., Disp: , Rfl:    •  furosemide (LASIX) 20 MG tablet, take 1 tablet by mouth once daily if needed, Disp: , Rfl: 0  •  ibuprofen (ADVIL,MOTRIN) 600 MG tablet, Take 1 tablet by mouth Every 8 (Eight) Hours As Needed for Mild Pain (1-3)., Disp: 12 tablet, Rfl: 0  •  topiramate (TOPAMAX) 25 MG capsule, Take 25 mg by mouth 2 (Two) Times a Day., Disp: , Rfl:     Allergies:   Allergies   Allergen Reactions   • Ciprofloxacin Rash   • Penicillins Rash         Family History   Problem Relation Age of Onset   • Breast cancer Sister      DCIS   • Heart failure Mother       at 74   • Heart failure Father      treated fro SCC anal canal.  at 80.        Social History     Social History   • Marital status:      Social History Main Topics   • Smoking status: Former Smoker   • Smokeless tobacco: Never Used      Comment: E-cig, quit smoking 10 yr ago.   • Alcohol use No   • Drug use: No   • Sexual activity: Defer     Other Topics Concern   • Not on file         Review of Systems   Constitutional: Negative for chills, fever and unexpected weight change.   HENT: Negative for hearing loss, trouble swallowing and voice change.    Eyes: Negative for visual disturbance.   Respiratory: Negative for apnea, cough, chest tightness, shortness of breath and wheezing.    Cardiovascular: Negative for chest pain, palpitations and leg swelling.   Gastrointestinal: Negative for abdominal distention, abdominal pain, anal bleeding, blood in stool, constipation, diarrhea, nausea, rectal pain and vomiting.   Endocrine: Negative for cold intolerance and heat intolerance.   Genitourinary: Negative for difficulty urinating, dysuria and flank pain.   Musculoskeletal: Negative for back pain and gait problem.   Skin: Negative for color change, rash and wound.   Neurological: Negative for dizziness, syncope, speech difficulty, weakness, light-headedness, numbness and headaches.   Hematological: Negative for adenopathy. Does not bruise/bleed easily.  "  Psychiatric/Behavioral: Negative for confusion. The patient is not nervous/anxious.        Objective    /86   Pulse 79   Temp 97.9 °F (36.6 °C) (Temporal Artery )   Ht 172.7 cm (67.99\")   Wt 99.8 kg (220 lb)   SpO2 99%   BMI 33.46 kg/m²     Physical Exam   Constitutional: She is oriented to person, place, and time. She appears well-developed and well-nourished.   HENT:   Head: Normocephalic and atraumatic.   Eyes: No scleral icterus.   Neck: Neck supple.   Cardiovascular: Regular rhythm.    Pulmonary/Chest: Effort normal.   Abdominal: Soft. She exhibits no distension. There is no tenderness.   Well-healed upper abdominal chevron incision noted.   Neurological: She is alert and oriented to person, place, and time.   Skin: Skin is warm and dry.   Psychiatric: She has a normal mood and affect. Her behavior is normal.     Outside Records:  I have taken the opportunity to review the patient's available outside records in paper format, scanned format and those available on Care Everywhere    Results Review:  I have reviewed the entirety of the patient's clinical lab results.  I have also personally reviewed the relevant patient imaging.         Assessment/Plan   Pao was seen today for colonoscopy.    Diagnoses and all orders for this visit:    Screening for colon cancer  -     Case Request; Standing  -     lactated ringers infusion; Infuse 50 mL/hr into a venous catheter Continuous.  -     Case Request    Other orders  -     Follow Anesthesia Guidelines / Standing Orders; Future  -     Provide NPO Instructions to Patient  -     Follow Anesthesia Guidelines / Standing Orders; Standing  -     Verify NPO Status; Standing  -     Obtain Informed Consent; Standing  -     Verify Bowel Prep Was Successful; Standing  -     Give Tap Water Enema If Bowel Prep Insufficient; Standing  -     Notify Physician - Standard; Standing  -     polyethylene glycol (MIRALAX) powder; Take 238 g by mouth 1 (One) Time for 1 dose. " Mix 238 grams of miralax with 64 oz garotade and drink at 4 pm day before colonoscopy  -     bisacodyl (DULCOLAX) 5 MG EC tablet; Take 4 tablets once by mouth at 1 pm on day before colonoscopy. (Patient taking differently: Take 20 mg by mouth. Take 4 tablets once by mouth at 1 pm on day before colonoscopy.  )        We discussed colonoscopy for colon cancer screening purposes.  We discussed the indications for screening colonoscopy as well as the risks, benefits and alternatives to this procedure. Risks including but not limited to perforation, bleeding,need for blood transfusion or emergent surgery ,and missed neoplasm were reviewed in detail with the patient. The necessary bowel preparation and pre-procedure clear liquid diet was explained in detail.  A written instructional handout was also provided.  Electronic prescriptions for miralax and dulcolax were sent to the patient's pharmacy.  The patient was given an opportunity to ask questions.  The patient verbalized understanding of these recommendations and the plan of care. The patient is willing to proceed with colonoscopy and has signed informed consent in the office today.  My office will arrange scheduling for the colonoscopy procedure and pre-admission testing.

## 2018-05-18 NOTE — DISCHARGE INSTRUCTIONS
To assist you in voiding:  Drink plenty of fluids  Listen to running water while attempting to void.    If you are unable to urinate and you have an uncomfortable urge to void or it has been   6 hours since you were discharged, return to the Emergency RoomNo pushing, pulling, tugging,  heavy lifting, or strenuous activity.    No major decision making, driving, or drinking alcoholic beverages for 24 hours. ( due to the medications you have  received)  Always use good hand hygiene/washing techniques.  NO driving while taking pain medications.

## 2018-05-18 NOTE — ANESTHESIA PREPROCEDURE EVALUATION
Anesthesia Evaluation     Patient summary reviewed and Nursing notes reviewed   no history of anesthetic complications:  NPO Solid Status: > 8 hours  NPO Liquid Status: > 8 hours           Airway   Mallampati: I  TM distance: >3 FB  Neck ROM: full  no difficulty expected  Dental - normal exam     Pulmonary - normal exam   (+) a smoker Former,   Cardiovascular - normal exam    Rhythm: regular  Rate: normal    (+) PVD,       Neuro/Psych  (+) headaches, psychiatric history Anxiety and Depression,     GI/Hepatic/Renal/Endo    (+) obesity,       Musculoskeletal (-) negative ROS    Abdominal  - normal exam    Abdomen: soft.  Bowel sounds: normal.   Substance History      OB/GYN negative ob/gyn ROS         Other      history of cancer remission                    Anesthesia Plan    ASA 2     MAC   (Risks and benefits discussed including risk of aspiration, recall and dental damage. All patient questions answered. Will continue with POC.)  intravenous induction   Anesthetic plan and risks discussed with patient.

## 2018-05-18 NOTE — ANESTHESIA POSTPROCEDURE EVALUATION
Patient: Pao Modi    Procedure Summary     Date:  05/18/18 Room / Location:  [facility] ENDOSCOPY 3 / [facility] ENDOSCOPY    Anesthesia Start:  1409 Anesthesia Stop:      Procedure:  COLONOSCOPY (N/A ) Diagnosis:       Screening for colon cancer      (Screening for colon cancer [Z12.11])    Surgeon:  Liz Carcamo MD Provider:  Seun Leon CRNA    Anesthesia Type:  MAC ASA Status:  2          Anesthesia Type: MAC  Last vitals  BP   100/61   Temp   98.1   Pulse   63   Resp   18   SpO2   99     Post Anesthesia Care and Evaluation    Patient location during evaluation: bedside  Patient participation: complete - patient participated  Level of consciousness: awake and alert  Pain score: 0  Pain management: adequate  Airway patency: patent  Anesthetic complications: No anesthetic complications  PONV Status: none  Cardiovascular status: acceptable  Respiratory status: acceptable and nasal cannula  Hydration status: acceptable

## 2018-08-14 ENCOUNTER — TRANSCRIBE ORDERS (OUTPATIENT)
Dept: ADMINISTRATIVE | Facility: HOSPITAL | Age: 38
End: 2018-08-14

## 2018-08-14 ENCOUNTER — HOSPITAL ENCOUNTER (OUTPATIENT)
Dept: GENERAL RADIOLOGY | Facility: HOSPITAL | Age: 38
Discharge: HOME | End: 2018-08-14
Admitting: NURSE PRACTITIONER

## 2018-08-14 DIAGNOSIS — R20.0 NUMBNESS OF FEET: ICD-10-CM

## 2018-08-14 DIAGNOSIS — Z12.31 SCREENING MAMMOGRAM, ENCOUNTER FOR: Primary | ICD-10-CM

## 2018-08-14 DIAGNOSIS — R20.2 BILATERAL LEG PARESTHESIA: Primary | ICD-10-CM

## 2018-08-14 PROCEDURE — 72110 X-RAY EXAM L-2 SPINE 4/>VWS: CPT

## 2019-08-18 ENCOUNTER — HOSPITAL ENCOUNTER (EMERGENCY)
Facility: HOSPITAL | Age: 39
Discharge: HOME | End: 2019-08-18
Attending: EMERGENCY MEDICINE | Admitting: EMERGENCY MEDICINE

## 2019-08-18 VITALS
HEIGHT: 69 IN | SYSTOLIC BLOOD PRESSURE: 138 MMHG | RESPIRATION RATE: 17 BRPM | HEART RATE: 86 BPM | OXYGEN SATURATION: 98 % | WEIGHT: 214.6 LBS | TEMPERATURE: 98.2 F | DIASTOLIC BLOOD PRESSURE: 98 MMHG | BODY MASS INDEX: 31.78 KG/M2

## 2019-08-18 DIAGNOSIS — B86 SCABIES: Primary | ICD-10-CM

## 2019-08-18 PROCEDURE — 99283 EMERGENCY DEPT VISIT LOW MDM: CPT

## 2019-08-18 RX ORDER — PERMETHRIN 50 MG/G
CREAM TOPICAL ONCE
Qty: 180 G | Refills: 1 | Status: SHIPPED | OUTPATIENT
Start: 2019-08-18 | End: 2019-08-18

## 2019-08-18 RX ORDER — IVERMECTIN 3 MG/1
20 TABLET ORAL ONCE
Qty: 7 TABLET | Refills: 0 | Status: SHIPPED | OUTPATIENT
Start: 2019-08-18 | End: 2019-08-18

## 2019-08-18 NOTE — ED PROVIDER NOTES
"Subjective   39-year-old female who comes in with chief complaint \"generalized rash\" x one week. Patient states that rash described as itching. Patient does report being exposed to scabies. Denies any other acute complaints at this time.         History provided by:  Patient   used: No    Rash   Location:  Full body  Quality: itchiness, painful and redness    Quality: not bruising    Pain details:     Quality:  Aching and dull    Severity:  No pain    Duration:  2 days    Timing:  Intermittent    Progression:  Worsening  Severity:  Moderate  Onset quality:  Sudden  Duration:  2 days  Timing:  Intermittent  Progression:  Worsening  Chronicity:  New  Context: not animal contact, not chemical exposure, not diapers, not exposure to similar rash, not hot tub use, not insect bite/sting, not medications, not plant contact, not pollen, not pregnancy and not sick contacts    Relieved by:  Nothing  Worsened by:  Nothing  Ineffective treatments:  None tried  Associated symptoms: no abdominal pain, no diarrhea, no fever, no headaches, no joint pain, no myalgias, no nausea, no throat swelling, no tongue swelling, no URI and not vomiting        Review of Systems   Constitutional: Negative for fever.   Gastrointestinal: Negative for abdominal pain, diarrhea, nausea and vomiting.   Musculoskeletal: Negative for arthralgias and myalgias.   Skin: Positive for rash.   Neurological: Negative for headaches.   All other systems reviewed and are negative.      Past Medical History:   Diagnosis Date   • ADHD (attention deficit hyperactivity disorder)    • Depression    • Gallbladder cancer (CMS/HCC)     found incidentally at the time of cholecystectomy.  Treated with chemo x 3 months prior to undergoing definitive liver resection with hepaticojejunoscopy and lymphadenectomy. treated at .    • History of fracture     AS A CHILD RIGHT WRIST, RIGHT FOOT   • Hx antineoplastic chemo    • Hyperlipidemia    • Migraines    • " Panic disorder        Allergies   Allergen Reactions   • Ciprofloxacin Rash   • Penicillins Rash       Past Surgical History:   Procedure Laterality Date   •  SECTION      X1   • CHOLECYSTECTOMY WITH COMMON BILE DUCT EXPLORATION  10/2014   • COLONOSCOPY     • COLONOSCOPY N/A 2018    Procedure: COLONOSCOPY;  Surgeon: Liz Carcamo MD;  Location: [facility] ENDOSCOPY;  Service: Gastroenterology   • ENDOSCOPY     • ERCP      WITH STENT PLACEMENT FOR GALL STONE, PRIOR TO LAP CHOLEY AND DIAGNOSIS OF GB CANCER   • LIVER RESECTION      for GB cancer, has hepaticojejunostomy   • PORTACATH PLACEMENT     • TUBAL ABDOMINAL LIGATION     • VENOUS ACCESS DEVICE (PORT) REMOVAL     • WISDOM TOOTH EXTRACTION         Family History   Problem Relation Age of Onset   • Breast cancer Sister         DCIS   • Heart failure Mother          at 74   • Heart failure Father         treated fro SCC anal canal.  at 80.        Social History     Socioeconomic History   • Marital status:      Spouse name: Not on file   • Number of children: Not on file   • Years of education: Not on file   • Highest education level: Not on file   Tobacco Use   • Smoking status: Former Smoker     Packs/day: 0.50     Years: 10.00     Pack years: 5.00     Types: Cigarettes, Electronic Cigarette     Last attempt to quit:      Years since quittin.6   • Smokeless tobacco: Never Used   • Tobacco comment: TRANSITIONED TO ECIG APPROXIMATELY 10 YEARS AGO.   Substance and Sexual Activity   • Alcohol use: No   • Drug use: No   • Sexual activity: Defer           Objective   Physical Exam   Constitutional: She is oriented to person, place, and time. She appears well-developed and well-nourished. No distress.   HENT:   Head: Normocephalic.   Right Ear: External ear normal.   Left Ear: External ear normal.   Nose: Nose normal.   Mouth/Throat: Oropharynx is clear and moist. No oropharyngeal exudate.   Eyes: Conjunctivae and EOM are  normal. Pupils are equal, round, and reactive to light. Right eye exhibits no discharge. Left eye exhibits no discharge. No scleral icterus.   Neck: Normal range of motion. Neck supple. No JVD present. No tracheal deviation present. No thyromegaly present.   Cardiovascular: Normal rate, regular rhythm, normal heart sounds and intact distal pulses. Exam reveals no gallop and no friction rub.   No murmur heard.  Pulmonary/Chest: Effort normal and breath sounds normal. No stridor. No respiratory distress. She has no wheezes. She has no rales. She exhibits no tenderness.   Abdominal: Soft. Bowel sounds are normal. She exhibits no distension and no mass. There is no rebound and no guarding. No hernia.   Musculoskeletal: Normal range of motion. She exhibits no edema, tenderness or deformity.   Lymphadenopathy:     She has no cervical adenopathy.   Neurological: She is alert and oriented to person, place, and time. She displays normal reflexes. No cranial nerve deficit or sensory deficit. She exhibits normal muscle tone. Coordination normal.   Skin: Skin is warm and dry. Capillary refill takes less than 2 seconds. Rash noted. She is not diaphoretic. No erythema. No pallor.   Psychiatric: She has a normal mood and affect. Her behavior is normal. Judgment and thought content normal.   Nursing note and vitals reviewed.      Procedures           ED Course                  MDM      Final diagnoses:   Seun Wheat PA-C  08/18/19 0977

## 2021-03-26 ENCOUNTER — TRANSCRIBE ORDERS (OUTPATIENT)
Dept: ADMINISTRATIVE | Facility: HOSPITAL | Age: 41
End: 2021-03-26

## 2021-03-26 DIAGNOSIS — Z12.31 VISIT FOR SCREENING MAMMOGRAM: Primary | ICD-10-CM

## 2021-04-16 ENCOUNTER — HOSPITAL ENCOUNTER (OUTPATIENT)
Dept: MAMMOGRAPHY | Facility: HOSPITAL | Age: 41
Discharge: HOME | End: 2021-04-16
Admitting: NURSE PRACTITIONER

## 2021-04-16 DIAGNOSIS — Z12.31 VISIT FOR SCREENING MAMMOGRAM: ICD-10-CM

## 2021-04-16 PROCEDURE — 77063 BREAST TOMOSYNTHESIS BI: CPT

## 2021-04-16 PROCEDURE — 77067 SCR MAMMO BI INCL CAD: CPT

## 2021-05-18 ENCOUNTER — TELEPHONE (OUTPATIENT)
Dept: SURGERY | Facility: CLINIC | Age: 41
End: 2021-05-18

## 2021-05-18 NOTE — TELEPHONE ENCOUNTER
Patient stated she forgot about her appointment and would call back to reschedule. I also notified the patient she will be receiving a letter.

## 2021-09-21 ENCOUNTER — OFFICE VISIT (OUTPATIENT)
Dept: OBSTETRICS AND GYNECOLOGY | Facility: CLINIC | Age: 41
End: 2021-09-21

## 2021-09-21 VITALS
WEIGHT: 214 LBS | HEIGHT: 68 IN | DIASTOLIC BLOOD PRESSURE: 64 MMHG | BODY MASS INDEX: 32.43 KG/M2 | SYSTOLIC BLOOD PRESSURE: 110 MMHG

## 2021-09-21 DIAGNOSIS — N93.9 ABNORMAL UTERINE BLEEDING (AUB): ICD-10-CM

## 2021-09-21 DIAGNOSIS — R10.2 PELVIC PAIN: ICD-10-CM

## 2021-09-21 DIAGNOSIS — Z01.419 WELL WOMAN EXAM WITH ROUTINE GYNECOLOGICAL EXAM: Primary | ICD-10-CM

## 2021-09-21 DIAGNOSIS — Z12.4 SCREENING FOR MALIGNANT NEOPLASM OF CERVIX: ICD-10-CM

## 2021-09-21 DIAGNOSIS — Z98.890 HISTORY OF ENDOMETRIAL ABLATION: ICD-10-CM

## 2021-09-21 DIAGNOSIS — Z98.51 H/O TUBAL LIGATION: ICD-10-CM

## 2021-09-21 PROBLEM — Z85.09: Status: ACTIVE | Noted: 2021-09-21

## 2021-09-21 PROBLEM — Z98.891 PREVIOUS CESAREAN SECTION: Status: ACTIVE | Noted: 2021-09-21

## 2021-09-21 PROCEDURE — 99386 PREV VISIT NEW AGE 40-64: CPT | Performed by: OBSTETRICS & GYNECOLOGY

## 2021-09-21 RX ORDER — UBROGEPANT 50 MG/1
TABLET ORAL
COMMUNITY
Start: 2021-08-10

## 2021-09-21 RX ORDER — ONDANSETRON 4 MG/1
TABLET, FILM COATED ORAL
COMMUNITY
Start: 2021-08-02

## 2021-09-21 RX ORDER — ONABOTULINUMTOXINA 200 [USP'U]/1
INJECTION, POWDER, LYOPHILIZED, FOR SOLUTION INTRADERMAL; INTRAMUSCULAR
COMMUNITY
Start: 2021-09-08

## 2021-09-21 RX ORDER — LAMOTRIGINE 150 MG/1
150 TABLET ORAL 2 TIMES DAILY
COMMUNITY
Start: 2021-08-10

## 2021-09-21 RX ORDER — BUPROPION HYDROCHLORIDE 150 MG/1
TABLET ORAL
COMMUNITY
Start: 2021-07-13

## 2021-09-21 RX ORDER — DEXTROAMPHETAMINE SACCHARATE, AMPHETAMINE ASPARTATE, DEXTROAMPHETAMINE SULFATE AND AMPHETAMINE SULFATE 7.5; 7.5; 7.5; 7.5 MG/1; MG/1; MG/1; MG/1
TABLET ORAL
COMMUNITY
Start: 2021-09-13

## 2021-09-21 RX ORDER — METHOCARBAMOL 750 MG/1
TABLET, FILM COATED ORAL
COMMUNITY
Start: 2021-08-01

## 2021-09-21 NOTE — PROGRESS NOTES
Annual Well Woman Visit    Subjective   Chief Complaint   Patient presents with   • Menstrual Problem     Irregular periods since March, has only had 4 since then.     Pao Modi is a 41 y.o. year old  presenting to be seen for an annual well woman visit.    Irregular periods since March of this year.  She does have a history of endometrial ablation in .  She had very heavy bleeding requiring blood transfusions prior to the ablation.  She was never amenorrheic after the ablation procedure.  In 2014/15, she had two large surgeries for gallbladder cancer.  She did receive chemotherapy in  and was amenorrheic for 8 months or so.  She does have pain and bloating with her bleeding.  Previous tubal ligation.    She denies sexual dysfunction. She denies urinary complaints including incontinence.    OB Hx: 3 vaginal births, 1   Contraception: Tubal ligation  Pap smear:   Mammogram: 2021, sister in her 40s had breast cancer, negative genetic testing  Colonoscopy:   DEXA Scan: Never    Past Medical History:   Diagnosis Date   • Abnormal Pap smear of cervix    • ADHD (attention deficit hyperactivity disorder)    • Cervical dysplasia    • Depression    • Gallbladder cancer (CMS/HCC)     found incidentally at the time of cholecystectomy.  Treated with chemo x 3 months prior to undergoing definitive liver resection with hepaticojejunoscopy and lymphadenectomy. treated at .    • Gestational diabetes    • History of fracture     AS A CHILD RIGHT WRIST, RIGHT FOOT   • HPV (human papilloma virus) infection    • Hx antineoplastic chemo    • Hyperlipidemia    • Migraines    • Multiple gestation    • Panic disorder      Past Surgical History:   Procedure Laterality Date   •  SECTION      X1   • CHOLECYSTECTOMY WITH COMMON BILE DUCT EXPLORATION  10/2014   • COLONOSCOPY     • COLONOSCOPY N/A 2018    Procedure: COLONOSCOPY;  Surgeon: Liz Carcamo MD;  Location: [facility]  ENDOSCOPY;  Service: Gastroenterology   • ENDOSCOPY     • ERCP      WITH STENT PLACEMENT FOR GALL STONE, PRIOR TO LAP CHOLEY AND DIAGNOSIS OF GB CANCER   • LIVER RESECTION      for GB cancer, has hepaticojejunostomy   • PORTACATH PLACEMENT     • TUBAL ABDOMINAL LIGATION     • VENOUS ACCESS DEVICE (PORT) REMOVAL     • WISDOM TOOTH EXTRACTION       Family History   Problem Relation Age of Onset   • Breast cancer Sister         DCIS   • Heart failure Mother          at 74   • Diabetes Mother    • Hypertension Mother    • Heart failure Father         treated fro SCC anal canal.  at 80.    • Prostate cancer Father    • Diabetes Father      Social History     Tobacco Use   • Smoking status: Former Smoker     Packs/day: 0.50     Years: 10.00     Pack years: 5.00     Types: Cigarettes, Electronic Cigarette     Quit date:      Years since quittin.7   • Smokeless tobacco: Never Used   • Tobacco comment: TRANSITIONED TO ECIG APPROXIMATELY 10 YEARS AGO.   Vaping Use   • Vaping Use: Never used   Substance Use Topics   • Alcohol use: No   • Drug use: No     (Not in a hospital admission)    Ciprofloxacin and Penicillins  Current Outpatient Medications on File Prior to Visit   Medication Sig Dispense Refill   • amphetamine-dextroamphetamine (ADDERALL) 30 MG tablet      • Botox 200 units reconstituted solution      • buPROPion XL (WELLBUTRIN XL) 150 MG 24 hr tablet      • buPROPion XL (WELLBUTRIN XL) 300 MG 24 hr tablet Take 300 mg by mouth Every Morning.  0   • clonazePAM (KlonoPIN) 0.5 MG tablet Take 0.5 mg by mouth 2 (Two) Times a Day As Needed for Anxiety.     • furosemide (LASIX) 20 MG tablet take 1 tablet by mouth once daily if needed  0   • lamoTRIgine (LaMICtal) 150 MG tablet Take 150 mg by mouth 2 (Two) Times a Day.     • methocarbamol (ROBAXIN) 750 MG tablet      • ondansetron (ZOFRAN) 4 MG tablet      • topiramate (TOPAMAX) 25 MG capsule Take 25 mg by mouth 2 (Two) Times a Day.     • ubrogepant tablet  "TAKE ONE TABLET AT ONSET OF HEADACHE. MAY REPEAT IN 2 HOURS IF NEEDED. MAX DOSE IN 24 HOURS     • permethrin (ELIMITE) 5 % cream Apply  topically to the appropriate area as directed 1 (One) Time for 1 dose. Apply from neck down. Leave on overnight. 180 g 1     No current facility-administered medications on file prior to visit.     Social History    Tobacco Use      Smoking status: Former Smoker        Packs/day: 0.50        Years: 10.00        Pack years: 5        Types: Cigarettes, Electronic Cigarette        Quit date:         Years since quittin.7      Smokeless tobacco: Never Used      Tobacco comment: TRANSITIONED TO ECIG APPROXIMATELY 10 YEARS AGO.      Review of Systems  Pertinent items are noted in HPI, all other systems were reviewed and negative       Objective   /64   Ht 172.7 cm (68\")   Wt 97.1 kg (214 lb)   BMI 32.54 kg/m²     Physical Exam:  General Appearance: alert, pleasant, appears stated age, interactive and cooperative  Breasts: Examined in supine position  Symmetric without masses or skin dimpling  Nipples normal without inversion, lesions or discharge  There are no palpable axillary nodes  Abdomen: no masses, no hepatomegaly, no splenomegaly, soft non-tender, no guarding and no rebound tenderness    Pelvis:  Pelvic: Clinical staff was present for exam  External genitalia:  normal appearance of the external genitalia including Bartholin's and [place]'s glands.  :  urethral meatus normal;  Vagina:  normal pink mucosa without prolapse or lesions.  Cervix:  normal appearance.  Uterus: Mildly enlarged, nontender to palpation, no masses  Adnexa:  normal bimanual exam of the adnexa.  Rectal:  digital rectal exam not performed; anus visually normal appearing.       Assessment   Annual well woman exam with age appropriate screening  Abnormal uterine bleeding  Pelvic pain and bloating  Previous endometrial ablation and BTL     Plan    Orders Placed This Encounter   Procedures   • " NuSwab VG+ - Swab, Cervix     Order Specific Question:   Release to patient     Answer:   Immediate     Medications ordered: None    Procedures performed: Pap smear    - Mammogram: Not indicated   - Pap screening guidelines reviewed; pap smear collected today  - Yearly clinical breast and pelvic exams recommended regardless of pap recommendations  - Dexa scan: not indicated  - Colonoscopy: not indicated  - Healthy diet and exercise encouraged  - Calcium and Vitamin D requirements reviewed  - Contraception: BTL  - Screening: None  - Vaginal cultures obtained today    Follow up for pelvic ultrasound next week    Edgar Chavez MD  Obstetrics and Gynecology  [facility]

## 2021-09-23 LAB
A VAGINAE DNA VAG QL NAA+PROBE: NORMAL SCORE
BVAB2 DNA VAG QL NAA+PROBE: NORMAL SCORE
C ALBICANS DNA VAG QL NAA+PROBE: NEGATIVE
C GLABRATA DNA VAG QL NAA+PROBE: NEGATIVE
C TRACH DNA VAG QL NAA+PROBE: NEGATIVE
MEGA1 DNA VAG QL NAA+PROBE: NORMAL SCORE
N GONORRHOEA DNA VAG QL NAA+PROBE: NEGATIVE
T VAGINALIS DNA VAG QL NAA+PROBE: NEGATIVE

## 2021-09-28 ENCOUNTER — OFFICE VISIT (OUTPATIENT)
Dept: OBSTETRICS AND GYNECOLOGY | Facility: CLINIC | Age: 41
End: 2021-09-28

## 2021-09-28 ENCOUNTER — TRANSCRIBE ORDERS (OUTPATIENT)
Dept: LAB | Facility: HOSPITAL | Age: 41
End: 2021-09-28

## 2021-09-28 ENCOUNTER — LAB (OUTPATIENT)
Dept: LAB | Facility: HOSPITAL | Age: 41
End: 2021-09-28

## 2021-09-28 VITALS
SYSTOLIC BLOOD PRESSURE: 110 MMHG | WEIGHT: 215 LBS | HEIGHT: 68 IN | BODY MASS INDEX: 32.58 KG/M2 | DIASTOLIC BLOOD PRESSURE: 64 MMHG

## 2021-09-28 DIAGNOSIS — Z98.51 H/O TUBAL LIGATION: ICD-10-CM

## 2021-09-28 DIAGNOSIS — Z20.822 COVID-19 RULED OUT: Primary | ICD-10-CM

## 2021-09-28 DIAGNOSIS — R10.2 PELVIC PAIN: ICD-10-CM

## 2021-09-28 DIAGNOSIS — N93.9 ABNORMAL UTERINE BLEEDING (AUB): Primary | ICD-10-CM

## 2021-09-28 DIAGNOSIS — Z20.822 COVID-19 RULED OUT: ICD-10-CM

## 2021-09-28 DIAGNOSIS — Z98.890 HISTORY OF ENDOMETRIAL ABLATION: ICD-10-CM

## 2021-09-28 LAB — SARS-COV-2 RNA NOSE QL NAA+PROBE: NOT DETECTED

## 2021-09-28 PROCEDURE — U0004 COV-19 TEST NON-CDC HGH THRU: HCPCS

## 2021-09-28 PROCEDURE — U0005 INFEC AGEN DETEC AMPLI PROBE: HCPCS

## 2021-09-28 PROCEDURE — C9803 HOPD COVID-19 SPEC COLLECT: HCPCS

## 2021-09-28 PROCEDURE — 99214 OFFICE O/P EST MOD 30 MIN: CPT | Performed by: OBSTETRICS & GYNECOLOGY

## 2021-09-28 RX ORDER — MEDROXYPROGESTERONE ACETATE 10 MG/1
10 TABLET ORAL DAILY
Qty: 30 TABLET | Refills: 5 | Status: SHIPPED | OUTPATIENT
Start: 2021-09-28

## 2021-09-28 RX ORDER — MEDROXYPROGESTERONE ACETATE 10 MG/1
10 TABLET ORAL DAILY
Qty: 30 TABLET | Refills: 5 | Status: SHIPPED | OUTPATIENT
Start: 2021-09-28 | End: 2021-09-28 | Stop reason: SDUPTHER

## 2021-10-05 DIAGNOSIS — Z12.4 SCREENING FOR MALIGNANT NEOPLASM OF CERVIX: ICD-10-CM

## 2021-10-11 ENCOUNTER — PREP FOR SURGERY (OUTPATIENT)
Dept: OTHER | Facility: HOSPITAL | Age: 41
End: 2021-10-11

## 2021-10-11 DIAGNOSIS — N92.1 MENORRHAGIA WITH IRREGULAR CYCLE: ICD-10-CM

## 2021-10-11 DIAGNOSIS — Z98.890 HISTORY OF ENDOMETRIAL ABLATION: ICD-10-CM

## 2021-10-11 DIAGNOSIS — N93.9 ABNORMAL UTERINE BLEEDING (AUB): Primary | ICD-10-CM

## 2021-10-11 DIAGNOSIS — R10.2 PELVIC PAIN: ICD-10-CM

## 2021-10-11 DIAGNOSIS — Z30.2 ENCOUNTER FOR TUBAL LIGATION: ICD-10-CM

## 2021-10-11 RX ORDER — PHENAZOPYRIDINE HYDROCHLORIDE 100 MG/1
200 TABLET, FILM COATED ORAL ONCE
Status: CANCELLED | OUTPATIENT
Start: 2021-10-11 | End: 2021-10-11

## 2021-10-11 RX ORDER — SODIUM CHLORIDE 0.9 % (FLUSH) 0.9 %
3 SYRINGE (ML) INJECTION EVERY 12 HOURS SCHEDULED
Status: CANCELLED | OUTPATIENT
Start: 2021-10-11

## 2021-10-11 RX ORDER — CLINDAMYCIN PHOSPHATE 900 MG/50ML
900 INJECTION, SOLUTION INTRAVENOUS ONCE
Status: CANCELLED | OUTPATIENT
Start: 2021-10-11 | End: 2021-10-11

## 2021-10-11 RX ORDER — SODIUM CHLORIDE 0.9 % (FLUSH) 0.9 %
10 SYRINGE (ML) INJECTION AS NEEDED
Status: CANCELLED | OUTPATIENT
Start: 2021-10-11

## 2021-10-11 NOTE — PROGRESS NOTES
GYN Office Visit    Subjective   Chief Complaint   Patient presents with   • Follow-up     TVS done today for abnormal bleeding, pelvic pain and bloating.     Pao Modi is a 41 y.o. year old  presenting to be seen for follow-up heavy bleeding.    Irregular periods since March of this year.  She does have a history of endometrial ablation in .  She had very heavy bleeding requiring blood transfusions prior to the ablation.  She was never amenorrheic after the ablation procedure.  In 2014/15, she had two large surgeries for gallbladder cancer.  She did receive chemotherapy in  and was amenorrheic for 8 months or so.  She does have pain and bloating with her bleeding.  Previous tubal ligation.  Recent Pap smear and vaginal cultures were reassuring.  Her bleeding symptoms affect her ability to participate in daily life activities.     She denies sexual dysfunction. She denies urinary complaints including incontinence.     OB Hx: 3 vaginal births, 1   Contraception: Tubal ligation  Pap smear: NEG Cotesting   Mammogram: 2021, sister in her 40s had breast cancer, negative genetic testing  Colonoscopy:   DEXA Scan: Never    Past Medical History:   Diagnosis Date   • Abnormal Pap smear of cervix    • ADHD (attention deficit hyperactivity disorder)    • Cervical dysplasia    • Depression    • Gallbladder cancer (CMS/HCC)     found incidentally at the time of cholecystectomy.  Treated with chemo x 3 months prior to undergoing definitive liver resection with hepaticojejunoscopy and lymphadenectomy. treated at .    • Gestational diabetes    • History of fracture     AS A CHILD RIGHT WRIST, RIGHT FOOT   • HPV (human papilloma virus) infection    • Hx antineoplastic chemo    • Hyperlipidemia    • Migraines    • Multiple gestation    • Panic disorder        Past Surgical History:   Procedure Laterality Date   •  SECTION      X1   • CHOLECYSTECTOMY WITH COMMON BILE DUCT EXPLORATION   10/2014   • COLONOSCOPY     • COLONOSCOPY N/A 2018    Procedure: COLONOSCOPY;  Surgeon: Liz Carcamo MD;  Location: [facility] ENDOSCOPY;  Service: Gastroenterology   • ENDOSCOPY     • ERCP      WITH STENT PLACEMENT FOR GALL STONE, PRIOR TO LAP CHOLEY AND DIAGNOSIS OF GB CANCER   • LIVER RESECTION      for GB cancer, has hepaticojejunostomy   • PORTACATH PLACEMENT     • TUBAL ABDOMINAL LIGATION     • VENOUS ACCESS DEVICE (PORT) REMOVAL     • WISDOM TOOTH EXTRACTION         Family History   Problem Relation Age of Onset   • Breast cancer Sister         DCIS   • Heart failure Mother          at 74   • Diabetes Mother    • Hypertension Mother    • Heart failure Father         treated fro SCC anal canal.  at 80.    • Prostate cancer Father    • Diabetes Father         Social History     Tobacco Use   • Smoking status: Former Smoker     Packs/day: 0.50     Years: 10.00     Pack years: 5.00     Types: Cigarettes, Electronic Cigarette     Quit date:      Years since quittin.7   • Smokeless tobacco: Never Used   • Tobacco comment: TRANSITIONED TO ECIG APPROXIMATELY 10 YEARS AGO.   Vaping Use   • Vaping Use: Never used   Substance Use Topics   • Alcohol use: No   • Drug use: No       (Not in a hospital admission)      Ciprofloxacin and Penicillins    Current Outpatient Medications on File Prior to Visit   Medication Sig Dispense Refill   • amphetamine-dextroamphetamine (ADDERALL) 30 MG tablet      • Botox 200 units reconstituted solution      • buPROPion XL (WELLBUTRIN XL) 150 MG 24 hr tablet      • buPROPion XL (WELLBUTRIN XL) 300 MG 24 hr tablet Take 300 mg by mouth Every Morning.  0   • clonazePAM (KlonoPIN) 0.5 MG tablet Take 0.5 mg by mouth 2 (Two) Times a Day As Needed for Anxiety.     • furosemide (LASIX) 20 MG tablet take 1 tablet by mouth once daily if needed  0   • lamoTRIgine (LaMICtal) 150 MG tablet Take 150 mg by mouth 2 (Two) Times a Day.     • methocarbamol (ROBAXIN) 750 MG  "tablet      • ondansetron (ZOFRAN) 4 MG tablet      • topiramate (TOPAMAX) 25 MG capsule Take 25 mg by mouth 2 (Two) Times a Day.     • ubrogepant tablet TAKE ONE TABLET AT ONSET OF HEADACHE. MAY REPEAT IN 2 HOURS IF NEEDED. MAX DOSE IN 24 HOURS     • permethrin (ELIMITE) 5 % cream Apply  topically to the appropriate area as directed 1 (One) Time for 1 dose. Apply from neck down. Leave on overnight. 180 g 1     No current facility-administered medications on file prior to visit.       Social History    Tobacco Use      Smoking status: Former Smoker        Packs/day: 0.50        Years: 10.00        Pack years: 5        Types: Cigarettes, Electronic Cigarette        Quit date:         Years since quittin.      Smokeless tobacco: Never Used      Tobacco comment: TRANSITIONED TO ECIG APPROXIMATELY 10 YEARS AGO.         Objective   /64   Ht 172.7 cm (68\")   Wt 97.5 kg (215 lb)   BMI 32.69 kg/m²     Physical Exam:  General Appearance: alert, pleasant, appears stated age, interactive and cooperative         Medical Decision Making:    I reviewed my most recent note and her pelvic ultrasound today.    Ultrasound:  Mildly enlarged, anteverted uterus with multiple small fibroids present.  The largest fibroid measures roughly 2.7 cm.  The endometrium measures 11.6 mm.  There is a 1.5 cm fluid-filled, cystic lesion noted in the posterior uterine fundus that could reflect a degenerating fibroid.  The cervix has multiple nabothian cysts as well.  Both ovaries are normal in appearance with multiple follicles and normal vascularity.  No free fluid in the cul-de-sac.    Assessment   Abnormal uterine bleeding  Menorrhagia with irregular cycle  Pelvic pain and bloating  Previous endometrial ablation and tubal ligation  Uterine fibroids     Plan    No orders of the defined types were placed in this encounter.      Medication Management: Provera 10 mg daily    Procedures Performed: None    We reviewed her situation in " detail today.  We reviewed her ultrasound findings and symptoms.  We discussed medical and surgical treatment options to address her bleeding and pain symptoms with emphasis on the Mirena IUD, oral progestins and hysterectomy.  After reviewing the risks and benefits of each approach, the patient desires definitive surgical management via hysterectomy.  Plan for a total laparoscopic hysterectomy, bilateral salpingectomy and cystoscopy.  Risks for the procedure were reviewed today.  We discussed the possible need for laparotomy, possible supracervical hysterectomy and the importance of ovarian preservation.  Start Provera as above until surgery can be performed. All questions answered.    Edgar Chavez MD  Obstetrics and Gynecology  [facility]

## 2021-11-09 ENCOUNTER — HOSPITAL ENCOUNTER (OUTPATIENT)
Facility: HOSPITAL | Age: 41
Setting detail: HOSPITAL OUTPATIENT SURGERY
End: 2021-11-09
Attending: OBSTETRICS & GYNECOLOGY | Admitting: OBSTETRICS & GYNECOLOGY

## 2021-11-09 PROBLEM — N92.1 MENORRHAGIA WITH IRREGULAR CYCLE: Status: ACTIVE | Noted: 2021-11-09

## 2022-01-03 ENCOUNTER — APPOINTMENT (OUTPATIENT)
Dept: PREADMISSION TESTING | Facility: HOSPITAL | Age: 42
End: 2022-01-03

## 2022-01-04 ENCOUNTER — TELEPHONE (OUTPATIENT)
Dept: OBSTETRICS AND GYNECOLOGY | Facility: CLINIC | Age: 42
End: 2022-01-04

## 2022-01-04 NOTE — TELEPHONE ENCOUNTER
----- Message from Edgar Chavez MD sent at 1/4/2022  1:56 PM EST -----  Regarding: FW: CANCELED HYSTERECTOMY  Please check in with this patient.  She has deferred a hysterectomy for now.  Make sure she is taking Provera 10 mg daily.  See if she needs refills.  Thanks  ----- Message -----  From: Gabrielle Hung  Sent: 1/3/2022   8:54 AM EST  To: Edgar Chavez MD  Subject: CANCELED HYSTERECTOMY                            Dr Chavez,  Patient wants to wait and see if she can control her bleeding with hormones before have the surgery.    ThanksGabrielle

## 2022-01-04 NOTE — TELEPHONE ENCOUNTER
Spoke with patient, states she is still taking the Provera and does not need refills at this time.

## 2022-04-10 ENCOUNTER — HOSPITAL ENCOUNTER (EMERGENCY)
Facility: HOSPITAL | Age: 42
Discharge: HOME | End: 2022-04-10
Attending: EMERGENCY MEDICINE | Admitting: EMERGENCY MEDICINE

## 2022-04-10 ENCOUNTER — APPOINTMENT (OUTPATIENT)
Dept: GENERAL RADIOLOGY | Facility: HOSPITAL | Age: 42
End: 2022-04-10

## 2022-04-10 VITALS
TEMPERATURE: 98.2 F | DIASTOLIC BLOOD PRESSURE: 109 MMHG | BODY MASS INDEX: 30.31 KG/M2 | WEIGHT: 200 LBS | RESPIRATION RATE: 18 BRPM | HEART RATE: 85 BPM | SYSTOLIC BLOOD PRESSURE: 173 MMHG | OXYGEN SATURATION: 98 % | HEIGHT: 68 IN

## 2022-04-10 DIAGNOSIS — S83.402A SPRAIN OF COLLATERAL LIGAMENT OF LEFT KNEE, INITIAL ENCOUNTER: ICD-10-CM
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
## 2022-04-10 PROCEDURE — 73562 X-RAY EXAM OF KNEE 3: CPT

## 2022-04-10 PROCEDURE — 99283 EMERGENCY DEPT VISIT LOW MDM: CPT

## 2022-04-10 RX ORDER — INDOMETHACIN 25 MG/1
25 CAPSULE ORAL 3 TIMES DAILY PRN
Qty: 20 CAPSULE | Refills: 0 | Status: SHIPPED | OUTPATIENT
Start: 2022-04-10

## 2022-04-10 RX ORDER — HYDROCODONE BITARTRATE AND ACETAMINOPHEN 5; 325 MG/1; MG/1
1 TABLET ORAL ONCE
Status: COMPLETED | OUTPATIENT
Start: 2022-04-10 | End: 2022-04-10

## 2022-04-10 RX ADMIN — HYDROCODONE BITARTRATE AND ACETAMINOPHEN 1 TABLET: 5; 325 TABLET ORAL at 22:47

## 2022-04-11 NOTE — ED PROVIDER NOTES
Subjective   This is a 41-year-old female who presents to the emergency department chief complaint left knee injury just prior to arrival.  Patient states she was jumping at a trampoline park when she injured her left knee.  Patient complains of aching, throbbing pain.  Patient does have pain with ambulation.      History provided by:  Patient  History limited by:  Age   used: No    Knee Pain  Location:  Knee  Time since incident:  1 day  Injury: no    Knee location:  L knee  Pain details:     Quality:  Aching, cramping, shooting and throbbing    Radiates to:  Back    Severity:  Moderate    Onset quality:  Gradual    Duration:  2 days    Timing:  Intermittent    Progression:  Worsening  Chronicity:  New  Dislocation: no    Foreign body present:  No foreign bodies  Tetanus status:  Unknown  Prior injury to area:  No  Relieved by:  Nothing  Worsened by:  Nothing  Ineffective treatments:  None tried  Associated symptoms: decreased ROM, stiffness and swelling    Associated symptoms: no fatigue    Risk factors: no concern for non-accidental trauma, no frequent fractures, no known bone disorder, no obesity and no recent illness        Review of Systems   Constitutional: Negative.  Negative for activity change, appetite change, chills, diaphoresis and fatigue.   HENT: Negative.  Negative for dental problem, drooling, ear discharge, ear pain, facial swelling, hearing loss, mouth sores and rhinorrhea.    Eyes: Negative.  Negative for photophobia, pain and itching.   Respiratory: Negative.  Negative for cough, choking, shortness of breath and stridor.    Cardiovascular: Negative.  Negative for chest pain, palpitations and leg swelling.   Gastrointestinal: Negative.  Negative for abdominal distention, anal bleeding, blood in stool, constipation, diarrhea and nausea.   Genitourinary: Negative.  Negative for dyspareunia, dysuria, enuresis, flank pain, frequency and genital sores.   Musculoskeletal: Positive  for arthralgias, joint swelling, myalgias and stiffness.   Skin: Negative.  Negative for color change, pallor, rash and wound.   Hematological: Negative.  Negative for adenopathy. Does not bruise/bleed easily.   Psychiatric/Behavioral: Negative.  Negative for behavioral problems, confusion, decreased concentration, dysphoric mood, hallucinations and self-injury. The patient is not nervous/anxious and is not hyperactive.    All other systems reviewed and are negative.      Past Medical History:   Diagnosis Date   • Abnormal Pap smear of cervix    • ADHD (attention deficit hyperactivity disorder)    • Cervical dysplasia    • Depression    • Gallbladder cancer (HCC)     found incidentally at the time of cholecystectomy.  Treated with chemo x 3 months prior to undergoing definitive liver resection with hepaticojejunoscopy and lymphadenectomy. treated at .    • Gestational diabetes    • History of fracture     AS A CHILD RIGHT WRIST, RIGHT FOOT   • HPV (human papilloma virus) infection    • Hx antineoplastic chemo    • Hyperlipidemia    • Migraines    • Multiple gestation    • Panic disorder        Allergies   Allergen Reactions   • Ciprofloxacin Rash   • Penicillins Rash       Past Surgical History:   Procedure Laterality Date   •  SECTION      X1   • CHOLECYSTECTOMY WITH COMMON BILE DUCT EXPLORATION  10/2014   • COLONOSCOPY     • COLONOSCOPY N/A 2018    Procedure: COLONOSCOPY;  Surgeon: Liz Carcamo MD;  Location: [facility] ENDOSCOPY;  Service: Gastroenterology   • ENDOSCOPY     • ERCP      WITH STENT PLACEMENT FOR GALL STONE, PRIOR TO LAP CHOLEY AND DIAGNOSIS OF GB CANCER   • LIVER RESECTION      for GB cancer, has hepaticojejunostomy   • PORTACATH PLACEMENT     • TUBAL ABDOMINAL LIGATION     • VENOUS ACCESS DEVICE (PORT) REMOVAL     • WISDOM TOOTH EXTRACTION         Family History   Problem Relation Age of Onset   • Breast cancer Sister         DCIS   • Heart failure Mother          at  74   • Diabetes Mother    • Hypertension Mother    • Heart failure Father         treated fro SCC anal canal.  at 80.    • Prostate cancer Father    • Diabetes Father        Social History     Socioeconomic History   • Marital status:    Tobacco Use   • Smoking status: Former Smoker     Packs/day: 0.50     Years: 10.00     Pack years: 5.00     Types: Cigarettes, Electronic Cigarette     Quit date:      Years since quittin.2   • Smokeless tobacco: Never Used   • Tobacco comment: TRANSITIONED TO ECIG APPROXIMATELY 10 YEARS AGO.   Vaping Use   • Vaping Use: Never used   Substance and Sexual Activity   • Alcohol use: No   • Drug use: No   • Sexual activity: Defer           Objective   Physical Exam  Vitals and nursing note reviewed.   Constitutional:       General: She is not in acute distress.     Appearance: Normal appearance. She is normal weight. She is not ill-appearing, toxic-appearing or diaphoretic.   HENT:      Head: Normocephalic and atraumatic.      Right Ear: Tympanic membrane, ear canal and external ear normal. There is no impacted cerumen.      Left Ear: Tympanic membrane, ear canal and external ear normal. There is no impacted cerumen.      Nose: Nose normal. No congestion or rhinorrhea.      Mouth/Throat:      Mouth: Mucous membranes are moist.      Pharynx: Oropharynx is clear. No oropharyngeal exudate or posterior oropharyngeal erythema.   Eyes:      General: No scleral icterus.        Right eye: No discharge.         Left eye: No discharge.      Extraocular Movements: Extraocular movements intact.      Conjunctiva/sclera: Conjunctivae normal.      Pupils: Pupils are equal, round, and reactive to light.   Cardiovascular:      Rate and Rhythm: Normal rate and regular rhythm.      Pulses: Normal pulses.      Heart sounds: Normal heart sounds. No murmur heard.    No friction rub. No gallop.   Pulmonary:      Effort: Pulmonary effort is normal. No respiratory distress.      Breath  sounds: Normal breath sounds. No stridor. No wheezing, rhonchi or rales.   Chest:      Chest wall: No tenderness.   Abdominal:      General: Abdomen is flat. Bowel sounds are normal. There is no distension.      Palpations: Abdomen is soft. There is no mass.      Tenderness: There is no abdominal tenderness. There is no right CVA tenderness, left CVA tenderness, guarding or rebound.      Hernia: No hernia is present.   Musculoskeletal:         General: Swelling, tenderness and signs of injury present. Normal range of motion.      Cervical back: Normal range of motion and neck supple. No rigidity or tenderness.   Lymphadenopathy:      Cervical: No cervical adenopathy.   Skin:     General: Skin is warm and dry.      Capillary Refill: Capillary refill takes less than 2 seconds.      Coloration: Skin is not jaundiced or pale.      Findings: No bruising, erythema, lesion or rash.   Neurological:      General: No focal deficit present.      Mental Status: She is alert and oriented to person, place, and time.      Cranial Nerves: No cranial nerve deficit.      Sensory: No sensory deficit.      Motor: No weakness.      Coordination: Coordination normal.      Gait: Gait normal.      Deep Tendon Reflexes: Reflexes normal.   Psychiatric:         Mood and Affect: Mood normal.         Behavior: Behavior normal.         Thought Content: Thought content normal.         Judgment: Judgment normal.         Procedures           ED Course                                                 MDM    Final diagnoses:   Sprain of collateral ligament of left knee, initial encounter   Fall, initial encounter       ED Disposition  ED Disposition     ED Disposition   Discharge    Condition   Stable    Comment   --             Elan Rahman MD  [address] [place] [id number] [phone]    Call in 1 day           Medication List      New Prescriptions    indomethacin 25 MG capsule  Commonly known as: INDOCIN  Take 1 capsule by  mouth 3 (Three) Times a Day As Needed for Mild Pain .           Where to Get Your Medications      These medications were sent to [id number] - [place], [place] - [address] AT [facility] [address] - [phone] PH - [phone] FX  [address], [place] [address]    Phone: [phone]   · indomethacin 25 MG capsule          Seun Severino PA-C  04/10/22 1419

## 2022-08-03 ENCOUNTER — HOSPITAL ENCOUNTER (EMERGENCY)
Facility: HOSPITAL | Age: 42
Discharge: HOME | End: 2022-08-03
Attending: EMERGENCY MEDICINE | Admitting: EMERGENCY MEDICINE

## 2022-08-03 VITALS
RESPIRATION RATE: 18 BRPM | BODY MASS INDEX: 34.77 KG/M2 | OXYGEN SATURATION: 100 % | WEIGHT: 229.4 LBS | HEART RATE: 76 BPM | SYSTOLIC BLOOD PRESSURE: 170 MMHG | HEIGHT: 68 IN | DIASTOLIC BLOOD PRESSURE: 107 MMHG | TEMPERATURE: 97.4 F

## 2022-08-03 DIAGNOSIS — K08.89 PAIN, DENTAL: ICD-10-CM

## 2022-08-03 DIAGNOSIS — K04.7 DENTAL INFECTION: Primary | ICD-10-CM

## 2022-08-03 PROCEDURE — 25010000002 DEXAMETHASONE SODIUM PHOSPHATE 10 MG/ML SOLUTION: Performed by: PHYSICIAN ASSISTANT

## 2022-08-03 PROCEDURE — 99283 EMERGENCY DEPT VISIT LOW MDM: CPT

## 2022-08-03 PROCEDURE — 0 LIDOCAINE 1 % SOLUTION: Performed by: PHYSICIAN ASSISTANT

## 2022-08-03 PROCEDURE — 96372 THER/PROPH/DIAG INJ SC/IM: CPT

## 2022-08-03 RX ORDER — DEXAMETHASONE SODIUM PHOSPHATE 10 MG/ML
10 INJECTION, SOLUTION INTRAMUSCULAR; INTRAVENOUS ONCE
Status: COMPLETED | OUTPATIENT
Start: 2022-08-03 | End: 2022-08-03

## 2022-08-03 RX ORDER — CHLORHEXIDINE GLUCONATE 0.12 MG/ML
15 RINSE ORAL 4 TIMES DAILY
Qty: 1893 ML | Refills: 0 | Status: SHIPPED | OUTPATIENT
Start: 2022-08-03

## 2022-08-03 RX ORDER — LIDOCAINE HYDROCHLORIDE 10 MG/ML
10 INJECTION, SOLUTION INFILTRATION; PERINEURAL ONCE
Status: COMPLETED | OUTPATIENT
Start: 2022-08-03 | End: 2022-08-03

## 2022-08-03 RX ORDER — ONDANSETRON 4 MG/1
4 TABLET, ORALLY DISINTEGRATING ORAL ONCE
Status: DISCONTINUED | OUTPATIENT
Start: 2022-08-03 | End: 2022-08-03

## 2022-08-03 RX ORDER — CLINDAMYCIN HYDROCHLORIDE 150 MG/1
450 CAPSULE ORAL ONCE
Status: COMPLETED | OUTPATIENT
Start: 2022-08-03 | End: 2022-08-03

## 2022-08-03 RX ORDER — CHLORHEXIDINE GLUCONATE 0.12 MG/ML
15 RINSE ORAL EVERY 12 HOURS SCHEDULED
Status: DISCONTINUED | OUTPATIENT
Start: 2022-08-03 | End: 2022-08-03 | Stop reason: HOSPADM

## 2022-08-03 RX ORDER — KETOROLAC TROMETHAMINE 30 MG/ML
60 INJECTION, SOLUTION INTRAMUSCULAR; INTRAVENOUS ONCE
Status: DISCONTINUED | OUTPATIENT
Start: 2022-08-03 | End: 2022-08-03

## 2022-08-03 RX ORDER — ACETAMINOPHEN 500 MG
1000 TABLET ORAL ONCE
Status: DISCONTINUED | OUTPATIENT
Start: 2022-08-03 | End: 2022-08-03

## 2022-08-03 RX ORDER — BUPIVACAINE HYDROCHLORIDE 5 MG/ML
10 INJECTION, SOLUTION EPIDURAL; INTRACAUDAL ONCE
Status: COMPLETED | OUTPATIENT
Start: 2022-08-03 | End: 2022-08-03

## 2022-08-03 RX ORDER — CLINDAMYCIN HYDROCHLORIDE 150 MG/1
450 CAPSULE ORAL 3 TIMES DAILY
Qty: 63 CAPSULE | Refills: 0 | Status: SHIPPED | OUTPATIENT
Start: 2022-08-03 | End: 2022-08-03 | Stop reason: SDUPTHER

## 2022-08-03 RX ORDER — CHLORHEXIDINE GLUCONATE 0.12 MG/ML
15 RINSE ORAL EVERY 12 HOURS SCHEDULED
Status: DISCONTINUED | OUTPATIENT
Start: 2022-08-03 | End: 2022-08-03

## 2022-08-03 RX ORDER — CLINDAMYCIN HYDROCHLORIDE 150 MG/1
450 CAPSULE ORAL 3 TIMES DAILY
Qty: 90 CAPSULE | Refills: 0 | Status: SHIPPED | OUTPATIENT
Start: 2022-08-03 | End: 2022-08-13

## 2022-08-03 RX ADMIN — LIDOCAINE HYDROCHLORIDE 10 ML: 10 INJECTION, SOLUTION INFILTRATION; PERINEURAL at 15:50

## 2022-08-03 RX ADMIN — BUPIVACAINE HYDROCHLORIDE 10 ML: 5 INJECTION, SOLUTION EPIDURAL; INTRACAUDAL; PERINEURAL at 15:50

## 2022-08-03 RX ADMIN — 0.12% CHLORHEXIDINE GLUCONATE 15 ML: 1.2 RINSE ORAL at 15:55

## 2022-08-03 RX ADMIN — DEXAMETHASONE SODIUM PHOSPHATE 10 MG: 10 INJECTION INTRAMUSCULAR; INTRAVENOUS at 15:17

## 2022-08-03 RX ADMIN — CLINDAMYCIN HYDROCHLORIDE 450 MG: 150 CAPSULE ORAL at 15:17

## 2022-08-03 NOTE — ED PROVIDER NOTES
Subjective   History of Present Illness   Patient is a 42-year-old female presenting to the ER with complaints of dental pain and infection.  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  She states that she has had some nausea since starting the Norco and is still having a lot of pain and increased swelling.  Patient is allergic to penicillins and ciprofloxacin.  She states that she plans to call and try to get in with a dentist tomorrow.  She denies fevers and additional symptoms or complaints at this time.    Review of Systems   HENT: Positive for dental problem.    All other systems reviewed and are negative.      Past Medical History:   Diagnosis Date   • Abnormal Pap smear of cervix    • ADHD (attention deficit hyperactivity disorder)    • Cervical dysplasia    • Depression    • Gallbladder cancer (HCC)     found incidentally at the time of cholecystectomy.  Treated with chemo x 3 months prior to undergoing definitive liver resection with hepaticojejunoscopy and lymphadenectomy. treated at .    • Gestational diabetes    • History of fracture     AS A CHILD RIGHT WRIST, RIGHT FOOT   • HPV (human papilloma virus) infection    • Hx antineoplastic chemo    • Hyperlipidemia    • Migraines    • Multiple gestation    • Panic disorder        Allergies   Allergen Reactions   • Ciprofloxacin Rash   • Penicillins Rash       Past Surgical History:   Procedure Laterality Date   •  SECTION      X1   • CHOLECYSTECTOMY WITH COMMON BILE DUCT EXPLORATION  10/2014   • COLONOSCOPY     • COLONOSCOPY N/A 2018    Procedure: COLONOSCOPY;  Surgeon: Liz Cacramo MD;  Location: [facility] ENDOSCOPY;  Service: Gastroenterology   • ENDOSCOPY     • ERCP      WITH STENT PLACEMENT FOR GALL STONE, PRIOR TO LAP CHOLEY AND DIAGNOSIS OF GB CANCER   • LIVER RESECTION      for GB cancer, has hepaticojejunostomy   • PORTACATH PLACEMENT     • TUBAL ABDOMINAL  LIGATION     • VENOUS ACCESS DEVICE (PORT) REMOVAL     • WISDOM TOOTH EXTRACTION         Family History   Problem Relation Age of Onset   • Breast cancer Sister         DCIS   • Heart failure Mother          at 74   • Diabetes Mother    • Hypertension Mother    • Heart failure Father         treated fro SCC anal canal.  at 80.    • Prostate cancer Father    • Diabetes Father        Social History     Socioeconomic History   • Marital status:    Tobacco Use   • Smoking status: Former Smoker     Packs/day: 0.50     Years: 10.00     Pack years: 5.00     Types: Cigarettes, Electronic Cigarette     Quit date:      Years since quittin.5   • Smokeless tobacco: Never Used   • Tobacco comment: TRANSITIONED TO ECIG APPROXIMATELY 10 YEARS AGO.   Vaping Use   • Vaping Use: Never used   Substance and Sexual Activity   • Alcohol use: No   • Drug use: No   • Sexual activity: Defer           Objective   Physical Exam  Vitals and nursing note reviewed.   Constitutional:       General: She is not in acute distress.     Appearance: She is not toxic-appearing.   HENT:      Head: Normocephalic and atraumatic.      Right Ear: External ear normal.      Left Ear: External ear normal.      Nose: Nose normal.      Mouth/Throat:      Comments: Obvious dental caries/infection to 3 of the right lower teeth, no abscess identified that would be amenable to I&D  Eyes:      Extraocular Movements: Extraocular movements intact.      Conjunctiva/sclera: Conjunctivae normal.   Cardiovascular:      Rate and Rhythm: Normal rate.   Pulmonary:      Effort: Pulmonary effort is normal. No respiratory distress.   Abdominal:      General: There is no distension.      Palpations: Abdomen is soft.   Musculoskeletal:         General: Normal range of motion.      Cervical back: Normal range of motion and neck supple.   Skin:     General: Skin is warm and dry.   Neurological:      General: No focal deficit present.      Mental Status: She is  alert and oriented to person, place, and time.   Psychiatric:         Mood and Affect: Mood normal.         Behavior: Behavior normal.         Dental Procedure    Date/Time: 8/3/2022 7:33 PM  Performed by: Oksana Chairez PA-C  Authorized by: Sandhya Woo MD     Consent:     Consent obtained:  Verbal    Consent given by:  Patient    Risks, benefits, and alternatives were discussed: yes      Risks discussed:  Infection, pain, nerve damage and unsuccessful block    Alternatives discussed:  No treatment and referral  Universal protocol:     Patient identity confirmed:  Verbally with patient  Indications:     Indications: dental pain    Location:     Block type:  Inferior alveolar    Laterality:  Right  Procedure details:     Needle gauge:  25 G    Anesthetic injected:  Lidocaine 1% w/o epi and bupivacaine 0.5% w/o epi    Injection procedure:  Anatomic landmarks identified, introduced needle, negative aspiration for blood and incremental injection  Post-procedure details:     Outcome:  Anesthesia achieved    Procedure completion:  Tolerated well, no immediate complications               ED Course                                           MDM   Patient was evaluated in the ER for right lower dental pain.  Patient is hemodynamically stable, afebrile, nontoxic-appearing on exam.  She has been prescribed Norco, Keflex, and Zofran.  She plans to get in with a dentist as soon as possible but was concerned due to increased pain and swelling.  Patient was started on clindamycin and advised to discontinue Keflex.  First dose was given in the ER.  She was also given Peridex solution and a shot of Decadron.  Dental block was performed and was successful.  Patient reports that the area feels completely numb.  She states that she is relieved after procedure.  She is agreeable with plan for discharge.  She was given information for  student dental clinic for follow-up.  Precautions were given for return to the ER for  any new or worsening symptoms.    Final diagnoses:   Dental infection   Pain, dental       ED Disposition  ED Disposition     ED Disposition   Discharge    Condition   Stable    Comment   --              [facility]  [facility]  [address] [place] [address]  [phone]  Schedule an appointment as soon as possible for a visit   for definitive care of dental pain and infection    [facility] Emergency Department  [address] [place] [id number] [phone]  Go to   As needed, If symptoms worsen         Medication List      New Prescriptions    chlorhexidine 0.12 % solution  Commonly known as: PERIDEX  Apply 15 mL to the mouth or throat 4 (Four) Times a Day.     clindamycin 150 MG capsule  Commonly known as: CLEOCIN  Take 3 capsules by mouth 3 (Three) Times a Day for 10 days.           Where to Get Your Medications      These medications were sent to [id number] - [place], [place] - [address] AT [facility] [address] - [phone] PH - [phone] FX  [address] [address]    Phone: [phone]   · chlorhexidine 0.12 % solution  · clindamycin 150 MG capsule          Oksana Chairez PA-C  08/03/22 [id number]       Oksana Chairez PA-C  08/03/22 [id number]

## 2022-08-03 NOTE — DISCHARGE INSTRUCTIONS
Take clindamycin as prescribed.  Use chlorhexidine oral rinse as prescribed.  Decadron is a long-acting steroid and should help with inflammation over the next few days.  Take Tylenol and Motrin over-the-counter as needed for pain.  Follow-up with dentistry for definitive care of dental infection.  Return to the ER for new or worsening symptoms or acute concerns.

## 2022-12-14 ENCOUNTER — TRANSCRIBE ORDERS (OUTPATIENT)
Dept: ADMINISTRATIVE | Facility: HOSPITAL | Age: 42
End: 2022-12-14

## 2022-12-14 DIAGNOSIS — Z12.31 VISIT FOR SCREENING MAMMOGRAM: Primary | ICD-10-CM

## 2023-02-27 ENCOUNTER — TRANSCRIBE ORDERS (OUTPATIENT)
Dept: GENERAL RADIOLOGY | Facility: HOSPITAL | Age: 43
End: 2023-02-27
Payer: MEDICARE

## 2023-02-27 ENCOUNTER — HOSPITAL ENCOUNTER (OUTPATIENT)
Dept: GENERAL RADIOLOGY | Facility: HOSPITAL | Age: 43
Discharge: HOME | End: 2023-02-27
Admitting: NURSE PRACTITIONER
Payer: MEDICARE

## 2023-02-27 DIAGNOSIS — M25.521 RIGHT ELBOW PAIN: ICD-10-CM

## 2023-02-27 DIAGNOSIS — M25.521 RIGHT ELBOW PAIN: Primary | ICD-10-CM

## 2023-02-27 PROCEDURE — 73080 X-RAY EXAM OF ELBOW: CPT

## 2023-03-03 ENCOUNTER — HOSPITAL ENCOUNTER (OUTPATIENT)
Dept: MAMMOGRAPHY | Facility: HOSPITAL | Age: 43
Discharge: HOME | End: 2023-03-03
Admitting: NURSE PRACTITIONER
Payer: MEDICARE

## 2023-03-03 DIAGNOSIS — Z12.31 VISIT FOR SCREENING MAMMOGRAM: ICD-10-CM

## 2023-03-03 PROCEDURE — 77063 BREAST TOMOSYNTHESIS BI: CPT

## 2023-03-03 PROCEDURE — 77067 SCR MAMMO BI INCL CAD: CPT

## 2023-03-31 ENCOUNTER — TELEPHONE (OUTPATIENT)
Dept: SURGERY | Facility: CLINIC | Age: 43
End: 2023-03-31
Payer: MEDICARE

## 2023-03-31 RX ORDER — POLYETHYLENE GLYCOL 3350 17 G/17G
POWDER, FOR SOLUTION ORAL
Qty: 238 G | Refills: 0 | Status: SHIPPED | OUTPATIENT
Start: 2023-03-31

## 2023-03-31 RX ORDER — BISACODYL 5 MG/1
TABLET, DELAYED RELEASE ORAL
Qty: 4 TABLET | Refills: 0 | Status: SHIPPED | OUTPATIENT
Start: 2023-03-31

## 2023-03-31 NOTE — TELEPHONE ENCOUNTER
Patient ready to schedule 5 year colonoscopy recall.  Last colonoscopy 5/18/2018.  Verified patient information.

## 2023-03-31 NOTE — TELEPHONE ENCOUNTER
PRESCREENING FOR OPEN ACCESS SCHEDULING    Pao Modi, 1980  [id number]    03/31/23    If, the patient answers yes to any of the following questions the provider will be informed prior to scheduling open access for approval and documented in the chart.    [x]  Yes  [] No    1. Have you ever had a colonoscopy in the past?      When:  2018      Where:       Polyps or other:     []  Yes  [x] No    2. Family history of colon cancer?      Relation:       Age of onset:       Do you currently have any of the following?    []  Yes  [x] No  Rectal bleeding, if so, how long?     []  Yes  [x] No  Abdominal pain, if so, how long?    []  Yes  [x] No  Constipation, if so, how long?    []  Yes  [x] No  Diarrhea, if so, how long?    []  Yes  [x] No  Weight loss, is so, how much?    [] Yes  [x] No  Small caliber stool, if so, how long?      Have you ever had any of the following conditions?    [] Yes  [x] No  Heart attack?      When?       Last cardiac workup?     Blood thinners?    [] Yes  [x] No   Lung problems, asthma or COPD?  [] Yes  [x] No  Oxygen required?       [] Yes  [x] No  Stroke?     [] Yes  [x] No  Have you ever had a reaction to anesthesia?

## 2023-05-10 ENCOUNTER — PREP FOR SURGERY (OUTPATIENT)
Dept: OTHER | Facility: HOSPITAL | Age: 43
End: 2023-05-10
Payer: MEDICARE

## 2023-05-10 DIAGNOSIS — Z12.11 COLON CANCER SCREENING: ICD-10-CM

## 2023-05-10 DIAGNOSIS — Z80.0 FAMILY HISTORY OF COLON CANCER IN FATHER: Primary | ICD-10-CM

## 2023-05-10 RX ORDER — SODIUM CHLORIDE 0.9 % (FLUSH) 0.9 %
10 SYRINGE (ML) INJECTION AS NEEDED
OUTPATIENT
Start: 2023-05-10

## 2023-05-10 RX ORDER — SODIUM CHLORIDE, SODIUM LACTATE, POTASSIUM CHLORIDE, CALCIUM CHLORIDE 600; 310; 30; 20 MG/100ML; MG/100ML; MG/100ML; MG/100ML
50 INJECTION, SOLUTION INTRAVENOUS CONTINUOUS
OUTPATIENT
Start: 2023-05-10

## 2023-05-10 RX ORDER — SODIUM CHLORIDE 0.9 % (FLUSH) 0.9 %
10 SYRINGE (ML) INJECTION EVERY 12 HOURS SCHEDULED
OUTPATIENT
Start: 2023-05-10

## 2023-05-10 RX ORDER — SODIUM CHLORIDE 9 MG/ML
40 INJECTION, SOLUTION INTRAVENOUS AS NEEDED
OUTPATIENT
Start: 2023-05-10

## 2023-05-11 PROBLEM — Z80.0 FAMILY HISTORY OF COLON CANCER IN FATHER: Status: ACTIVE | Noted: 2023-05-11

## 2023-07-20 ENCOUNTER — TRANSCRIBE ORDERS (OUTPATIENT)
Dept: LAB | Facility: HOSPITAL | Age: 43
End: 2023-07-20
Payer: MEDICARE

## 2023-07-20 ENCOUNTER — LAB (OUTPATIENT)
Dept: LAB | Facility: HOSPITAL | Age: 43
End: 2023-07-20
Payer: MEDICARE

## 2023-07-20 DIAGNOSIS — R21 RASH: Primary | ICD-10-CM

## 2023-07-20 DIAGNOSIS — R21 RASH: ICD-10-CM

## 2023-07-20 PROCEDURE — 36415 COLL VENOUS BLD VENIPUNCTURE: CPT

## 2023-07-20 PROCEDURE — 86787 VARICELLA-ZOSTER ANTIBODY: CPT

## 2023-07-21 LAB — VZV IGG SER IA-ACNC: 1716 INDEX

## 2023-10-04 ENCOUNTER — TELEPHONE (OUTPATIENT)
Dept: SURGERY | Facility: CLINIC | Age: 43
End: 2023-10-04

## 2023-10-04 NOTE — TELEPHONE ENCOUNTER
Hub staff attempted to follow warm transfer process and was unsuccessful     Caller: Pao Modi    Relationship to patient: Self    Best call back number: [phone]  PT CAN BE REACHED AT ANYTIME, IF NO ANSWER A DETAILED MSG CAN BE LEFT  Is it okay if the provider responds through MyChart:NO    Patient is needing: PT WOULD LIKE TO GO AHEAD TO HAVE HER COLONOSCOPY SCHEDULED.

## 2023-10-07 RX ORDER — POLYETHYLENE GLYCOL 3350 17 G/17G
238 POWDER, FOR SOLUTION ORAL DAILY
Qty: 238 G | Refills: 0 | Status: SHIPPED | OUTPATIENT
Start: 2023-10-07 | End: 2023-10-08

## 2023-10-07 RX ORDER — BISACODYL 5 MG/1
5 TABLET, DELAYED RELEASE ORAL DAILY PRN
Qty: 4 TABLET | Refills: 0 | Status: SHIPPED | OUTPATIENT
Start: 2023-10-07 | End: 2023-10-11

## 2023-10-10 ENCOUNTER — PREP FOR SURGERY (OUTPATIENT)
Dept: OTHER | Facility: HOSPITAL | Age: 43
End: 2023-10-10
Payer: MEDICARE

## 2023-10-10 PROBLEM — Z12.11 COLON CANCER SCREENING: Status: ACTIVE | Noted: 2023-05-10

## 2023-10-11 ENCOUNTER — TELEPHONE (OUTPATIENT)
Dept: SURGERY | Facility: CLINIC | Age: 43
End: 2023-10-11
Payer: MEDICARE

## 2023-10-16 ENCOUNTER — ANESTHESIA EVENT (OUTPATIENT)
Dept: GASTROENTEROLOGY | Facility: HOSPITAL | Age: 43
End: 2023-10-16
Payer: MEDICARE

## 2023-10-16 ENCOUNTER — HOSPITAL ENCOUNTER (OUTPATIENT)
Facility: HOSPITAL | Age: 43
Setting detail: HOSPITAL OUTPATIENT SURGERY
Discharge: HOME | End: 2023-10-16
Attending: SURGERY | Admitting: SURGERY
Payer: MEDICARE

## 2023-10-16 ENCOUNTER — LAB (OUTPATIENT)
Dept: LAB | Facility: HOSPITAL | Age: 43
End: 2023-10-16
Payer: MEDICARE

## 2023-10-16 ENCOUNTER — TRANSCRIBE ORDERS (OUTPATIENT)
Dept: LAB | Facility: HOSPITAL | Age: 43
End: 2023-10-16
Payer: MEDICARE

## 2023-10-16 ENCOUNTER — ANESTHESIA (OUTPATIENT)
Dept: GASTROENTEROLOGY | Facility: HOSPITAL | Age: 43
End: 2023-10-16
Payer: MEDICARE

## 2023-10-16 VITALS
OXYGEN SATURATION: 98 % | WEIGHT: 200 LBS | RESPIRATION RATE: 16 BRPM | SYSTOLIC BLOOD PRESSURE: 143 MMHG | BODY MASS INDEX: 29.62 KG/M2 | HEIGHT: 69 IN | HEART RATE: 66 BPM | TEMPERATURE: 98.8 F | DIASTOLIC BLOOD PRESSURE: 104 MMHG

## 2023-10-16 DIAGNOSIS — Z12.11 COLON CANCER SCREENING: ICD-10-CM

## 2023-10-16 DIAGNOSIS — Z80.0 FAMILY HISTORY OF COLON CANCER IN FATHER: ICD-10-CM

## 2023-10-16 DIAGNOSIS — M25.50 MULTIPLE JOINT PAIN: Primary | ICD-10-CM

## 2023-10-16 DIAGNOSIS — M25.50 MULTIPLE JOINT PAIN: ICD-10-CM

## 2023-10-16 LAB
B-HCG UR QL: NEGATIVE
EXPIRATION DATE: NORMAL
INTERNAL NEGATIVE CONTROL: NEGATIVE
INTERNAL POSITIVE CONTROL: POSITIVE
Lab: NORMAL

## 2023-10-16 PROCEDURE — 86140 C-REACTIVE PROTEIN: CPT

## 2023-10-16 PROCEDURE — 86431 RHEUMATOID FACTOR QUANT: CPT

## 2023-10-16 PROCEDURE — 85652 RBC SED RATE AUTOMATED: CPT

## 2023-10-16 PROCEDURE — 86225 DNA ANTIBODY NATIVE: CPT

## 2023-10-16 PROCEDURE — 86200 CCP ANTIBODY: CPT

## 2023-10-16 PROCEDURE — 25010000002 PROPOFOL 10 MG/ML EMULSION: Performed by: NURSE ANESTHETIST, CERTIFIED REGISTERED

## 2023-10-16 PROCEDURE — 36415 COLL VENOUS BLD VENIPUNCTURE: CPT

## 2023-10-16 PROCEDURE — 25810000003 LACTATED RINGERS PER 1000 ML: Performed by: SURGERY

## 2023-10-16 PROCEDURE — 81025 URINE PREGNANCY TEST: CPT | Performed by: SURGERY

## 2023-10-16 PROCEDURE — 84550 ASSAY OF BLOOD/URIC ACID: CPT

## 2023-10-16 PROCEDURE — S0260 H&P FOR SURGERY: HCPCS | Performed by: SURGERY

## 2023-10-16 PROCEDURE — 86038 ANTINUCLEAR ANTIBODIES: CPT

## 2023-10-16 RX ORDER — SODIUM CHLORIDE 0.9 % (FLUSH) 0.9 %
10 SYRINGE (ML) INJECTION AS NEEDED
Status: DISCONTINUED | OUTPATIENT
Start: 2023-10-16 | End: 2023-10-16 | Stop reason: HOSPADM

## 2023-10-16 RX ORDER — LIDOCAINE HYDROCHLORIDE 20 MG/ML
INJECTION, SOLUTION INTRAVENOUS AS NEEDED
Status: DISCONTINUED | OUTPATIENT
Start: 2023-10-16 | End: 2023-10-16 | Stop reason: SURG

## 2023-10-16 RX ORDER — SODIUM CHLORIDE 0.9 % (FLUSH) 0.9 %
10 SYRINGE (ML) INJECTION EVERY 12 HOURS SCHEDULED
Status: DISCONTINUED | OUTPATIENT
Start: 2023-10-16 | End: 2023-10-16 | Stop reason: HOSPADM

## 2023-10-16 RX ORDER — SODIUM CHLORIDE 9 MG/ML
40 INJECTION, SOLUTION INTRAVENOUS AS NEEDED
Status: DISCONTINUED | OUTPATIENT
Start: 2023-10-16 | End: 2023-10-16 | Stop reason: HOSPADM

## 2023-10-16 RX ORDER — SODIUM CHLORIDE, SODIUM LACTATE, POTASSIUM CHLORIDE, CALCIUM CHLORIDE 600; 310; 30; 20 MG/100ML; MG/100ML; MG/100ML; MG/100ML
50 INJECTION, SOLUTION INTRAVENOUS CONTINUOUS
Status: DISCONTINUED | OUTPATIENT
Start: 2023-10-16 | End: 2023-10-16 | Stop reason: HOSPADM

## 2023-10-16 RX ADMIN — SODIUM CHLORIDE, POTASSIUM CHLORIDE, SODIUM LACTATE AND CALCIUM CHLORIDE 50 ML/HR: 600; 310; 30; 20 INJECTION, SOLUTION INTRAVENOUS at 07:35

## 2023-10-16 RX ADMIN — LIDOCAINE HYDROCHLORIDE 60 MG: 20 INJECTION, SOLUTION INTRAVENOUS at 08:26

## 2023-10-16 RX ADMIN — PROPOFOL 200 MCG/KG/MIN: 10 INJECTION, EMULSION INTRAVENOUS at 08:26

## 2023-10-16 NOTE — H&P
General Surgery H&P    Name:Pao Modi  Age: 43 y.o.  Gender: female  : 1980  MRN: [id number]  Visit Number: [id number]  Admit Date: 10/16/2023  Date of Service: 10/16/23    Patient Care Team:  Zina Wu APRN as PCP - General (Nurse Practitioner)  Liz Carcamo MD as Surgeon (General Surgery)    Chief complaint: colon cancer screening/surveillance       History of Present Illness:     Pao Modi is a 43 y.o. female patient who presents for surveillance colonoscopy. Her last exam was in 2018.  She has a family history of colon cancer in her father. She has no current lower GI complaints.         Patient Active Problem List   Diagnosis    Screening for colon cancer    H/O gallbladder cancer    Previous  section    History of endometrial ablation    Pelvic pain    Abnormal uterine bleeding (AUB)    H/O tubal ligation    Menorrhagia with irregular cycle    Family history of colon cancer in father    Colon cancer screening       Past Medical History:   Diagnosis Date    Abnormal Pap smear of cervix     ADHD (attention deficit hyperactivity disorder)     Cervical dysplasia     Depression     Gallbladder cancer     found incidentally at the time of cholecystectomy.  Treated with chemo x 3 months prior to undergoing definitive liver resection with hepaticojejunoscopy and lymphadenectomy. treated at .     History of fracture     AS A CHILD RIGHT WRIST, RIGHT FOOT    HPV (human papilloma virus) infection     Hx antineoplastic chemo     Hyperlipidemia     Migraines     Multiple gestation     Panic disorder        Past Surgical History:   Procedure Laterality Date     SECTION      X1    CHOLECYSTECTOMY WITH COMMON BILE DUCT EXPLORATION  10/2014    COLONOSCOPY      COLONOSCOPY N/A 2018    Procedure: COLONOSCOPY;  Surgeon: Liz Carcamo MD;  Location: [facility] ENDOSCOPY;  Service: Gastroenterology    ENDOSCOPY      ERCP      WITH STENT PLACEMENT FOR GALL STONE, PRIOR TO  JAZMYNE WINTER AND DIAGNOSIS OF GB CANCER    LIVER RESECTION      for GB cancer, has hepaticojejunostomy    PORTACATH PLACEMENT      TUBAL ABDOMINAL LIGATION      VENOUS ACCESS DEVICE (PORT) REMOVAL      WISDOM TOOTH EXTRACTION         Family History   Problem Relation Age of Onset    Breast cancer Sister         DCIS    Heart failure Mother          at 74    Diabetes Mother     Hypertension Mother     Heart failure Father         treated fro SCC anal canal.  at 80.     Prostate cancer Father     Diabetes Father        Social History     Socioeconomic History    Marital status:    Tobacco Use    Smoking status: Former     Packs/day: 0.50     Years: 10.00     Additional pack years: 0.00     Total pack years: 5.00     Types: Cigarettes, Electronic Cigarette     Quit date:      Years since quitting: 15.8    Smokeless tobacco: Never    Tobacco comments:     TRANSITIONED TO ECIG APPROXIMATELY 10 YEARS AGO.   Vaping Use    Vaping Use: Never used   Substance and Sexual Activity    Alcohol use: No    Drug use: No    Sexual activity: Defer         Current Facility-Administered Medications:     lactated ringers infusion, 50 mL/hr, Intravenous, Continuous, Liz Carcamo MD, Last Rate: 50 mL/hr at 10/16/23 0735, 50 mL/hr at 10/16/23 0735    sodium chloride 0.9 % flush 10 mL, 10 mL, Intravenous, Q12H, Liz Carcamo MD    sodium chloride 0.9 % flush 10 mL, 10 mL, Intravenous, PRN, Liz Carcamo MD    sodium chloride 0.9 % infusion 40 mL, 40 mL, Intravenous, PRN, Liz Carcamo MD    Medications Prior to Admission   Medication Sig Dispense Refill Last Dose    amphetamine-dextroamphetamine (ADDERALL) 30 MG tablet    10/15/2023    bisacodyl (Dulcolax) 5 MG EC tablet Take as directed 4 tablet 0 10/15/2023    Botox 200 units reconstituted solution    10/10/2023    buPROPion XL (WELLBUTRIN XL) 150 MG 24 hr tablet    10/15/2023    buPROPion XL (WELLBUTRIN XL) 300 MG 24 hr tablet Take 1 tablet by mouth Every  Morning.  0 10/10/2023    chlorhexidine (PERIDEX) 0.12 % solution Apply 15 mL to the mouth or throat 4 (Four) Times a Day. 1893 mL 0 10/10/2023    clonazePAM (KlonoPIN) 0.5 MG tablet Take 1 tablet by mouth 2 (Two) Times a Day As Needed for Anxiety.   10/15/2023    furosemide (LASIX) 20 MG tablet take 1 tablet by mouth once daily if needed  0 10/15/2023    indomethacin (INDOCIN) 25 MG capsule Take 1 capsule by mouth 3 (Three) Times a Day As Needed for Mild Pain . 20 capsule 0 10/15/2023    lamoTRIgine (LaMICtal) 150 MG tablet Take 1 tablet by mouth 2 (Two) Times a Day.   10/10/2023    medroxyPROGESTERone (Provera) 10 MG tablet Take 1 tablet by mouth Daily. 30 tablet 5 10/10/2023    methocarbamol (ROBAXIN) 750 MG tablet    10/15/2023    ondansetron (ZOFRAN) 4 MG tablet    Past Week    polyethylene glycol (MiraLax) 17 GM/SCOOP powder Use as directed. 238 g 0 10/15/2023    topiramate (TOPAMAX) 25 MG capsule Take 1 capsule by mouth 2 (Two) Times a Day.   10/10/2023    ubrogepant tablet TAKE ONE TABLET AT ONSET OF HEADACHE. MAY REPEAT IN 2 HOURS IF NEEDED. MAX DOSE IN 24 HOURS   Past Week    levonorgestrel (Mirena, 52 MG,) 20 MCG/24HR IUD Deliver to provider's office 1 each 0     permethrin (ELIMITE) 5 % cream Apply  topically to the appropriate area as directed 1 (One) Time for 1 dose. Apply from neck down. Leave on overnight. 180 g 1        Allergies   Allergen Reactions    Ciprofloxacin Rash    Penicillins Rash       Review of Systems   Constitutional: Negative.    HENT: Negative.     Eyes: Negative.    Respiratory: Negative.     Cardiovascular: Negative.    Gastrointestinal: Negative.    Endocrine: Negative.    Genitourinary: Negative.    Musculoskeletal: Negative.    Skin: Negative.    Allergic/Immunologic: Negative.    Neurological: Negative.    Hematological: Negative.    Psychiatric/Behavioral: Negative.         OBJECTIVE:     Vital Signs  Temp:  [98 °F (36.7 °C)] 98 °F (36.7 °C)  Heart Rate:  [70] 70  Resp:  [16]  16  BP: (143)/(89) 143/89    No intake/output data recorded.  No intake/output data recorded.      Physical Exam:      General Appearance:    Alert, cooperative, in no acute distress   Head:    Normocephalic, without obvious abnormality, atraumatic   Eyes:            Lids and lashes normal, conjunctivae and sclerae normal, no icterus   Ears:    Ears appear intact with no abnormalities noted   Lungs:     Respirations regular, even and unlabored    Heart:    Regular rhythm and normal rate   Abdomen:     Soft, non-tender, non-distended, no guarding, no rebound   tenderness   Genitalia:    Deferred   Extremities:   Moves all extremities well, no edema, no cyanosis, no  redness   Pulses:   Pulses palpable and equal bilaterally   Skin:   No bleeding, bruising or rash   Neurologic:   AAOx3, no gross deficits         Results Review:  I have reviewed the entirety of the patient's clinical lab results.  I have also personally reviewed the patient's imaging      Lab Results (last 72 hours)       Procedure Component Value Units Date/Time    POC Pregnancy, Urine [[id number]] Collected: 10/16/23 0723    Specimen: Urine Updated: 10/16/23 0723     HCG, Urine, QL Negative     Lot Number #[id number]     Internal Positive Control Positive     Internal Negative Control Negative     Expiration Date 01/30/2025                            ASSESSMENT/PLAN:      Colon cancer screening    Screening for colon cancer    Family history of colon cancer in father      We discussed colonoscopy for colon cancer screening purposes.  We discussed the indications for screening colonoscopy as well as the risks, benefits and alternatives to this procedure. Risks including but not limited to perforation, bleeding,need for blood transfusion or emergent surgery ,and missed neoplasm were reviewed in detail with the patient.  The patient was given an opportunity to ask questions.  The patient verbalized understanding of these recommendations and the plan of  care. The patient is willing to proceed with colonoscopy and has signed informed consent.    Liz Carcamo MD  10/16/23  08:18 EDT

## 2023-10-16 NOTE — ANESTHESIA PREPROCEDURE EVALUATION
Anesthesia Evaluation     Patient summary reviewed and Nursing notes reviewed   no history of anesthetic complications:   NPO Solid Status: > 8 hours  NPO Liquid Status: > 8 hours           Airway   Mallampati: II  TM distance: >3 FB  Neck ROM: full  Possible difficult intubation  Dental - normal exam     Pulmonary - normal exam   (+) a smoker Former,  Cardiovascular - negative cardio ROS and normal exam  Exercise tolerance: good (4-7 METS)    Rhythm: regular  Rate: normal        Neuro/Psych  (+) headaches, psychiatric history Depression and ADHD  GI/Hepatic/Renal/Endo      Musculoskeletal     Abdominal  - normal exam    Abdomen: soft.  Bowel sounds: normal.   Substance History      OB/GYN negative ob/gyn ROS   (-)  Pregnant        Other      history of cancer                  Anesthesia Plan    ASA 3     MAC     (Risks and benefits discussed including risk of aspiration, recall and dental damage. All patient questions answered. Will continue with POC. )  intravenous induction     Anesthetic plan, risks, benefits, and alternatives have been provided, discussed and informed consent has been obtained with: patient.  Pre-procedure education provided    CODE STATUS:

## 2023-10-16 NOTE — ANESTHESIA POSTPROCEDURE EVALUATION
Patient: Pao Modi    Procedure Summary       Date: 10/16/23 Room / Location: [facility] ENDOSCOPY 1 / [facility] ENDOSCOPY    Anesthesia Start: 0822 Anesthesia Stop: 0853    Procedure: COLONOSCOPY Diagnosis:       Family history of colon cancer in father      Colon cancer screening      (Family history of colon cancer in father [Z80.0])      (Colon cancer screening [Z12.11])    Surgeons: Liz Carcamo MD Provider: Seun Teague CRNA    Anesthesia Type: MAC ASA Status: 3            Anesthesia Type: MAC    Vitals  No vitals data found for the desired time range.          Post Anesthesia Care and Evaluation    Patient location during evaluation: PHASE II  Patient participation: complete - patient participated  Level of consciousness: awake and alert  Pain score: 0  Pain management: satisfactory to patient    Airway patency: patent  Anesthetic complications: No anesthetic complications  PONV Status: none  Cardiovascular status: acceptable and stable  Respiratory status: acceptable and spontaneous ventilation  Hydration status: acceptable    Comments: Vitals signs as noted in nursing documentation as per protocol.

## 2023-10-17 LAB
ANA SER QL: POSITIVE
CCP IGA+IGG SERPL IA-ACNC: 0 UNITS (ref 0–19)
CHROMATIN AB SERPL-ACNC: <10 IU/ML (ref 0–14)
CRP SERPL-MCNC: 1.15 MG/DL (ref 0–0.5)
DSDNA AB SER-ACNC: 1 IU/ML (ref 0–9)
ERYTHROCYTE [SEDIMENTATION RATE] IN BLOOD: 4 MM/HR (ref 0–20)
Lab: NORMAL
URATE SERPL-MCNC: 6 MG/DL (ref 2.4–5.7)

## 2023-10-20 LAB — HLA-B27 QL NAA+PROBE: NEGATIVE

## 2023-11-15 ENCOUNTER — LAB REQUISITION (OUTPATIENT)
Dept: LAB | Facility: HOSPITAL | Age: 43
End: 2023-11-15
Payer: MEDICARE

## 2023-11-15 DIAGNOSIS — M10.9 GOUT, UNSPECIFIED: ICD-10-CM

## 2023-11-15 DIAGNOSIS — M25.562 PAIN IN LEFT KNEE: ICD-10-CM

## 2023-11-15 LAB — CRYSTALS FLD MICRO: ABNORMAL

## 2023-11-15 PROCEDURE — 89060 EXAM SYNOVIAL FLUID CRYSTALS: CPT | Performed by: PHYSICIAN ASSISTANT

## 2023-11-15 PROCEDURE — 87075 CULTR BACTERIA EXCEPT BLOOD: CPT | Performed by: PHYSICIAN ASSISTANT

## 2023-11-15 PROCEDURE — 87070 CULTURE OTHR SPECIMN AEROBIC: CPT | Performed by: PHYSICIAN ASSISTANT

## 2023-11-15 PROCEDURE — 87205 SMEAR GRAM STAIN: CPT | Performed by: PHYSICIAN ASSISTANT

## 2023-11-15 PROCEDURE — 87015 SPECIMEN INFECT AGNT CONCNTJ: CPT | Performed by: PHYSICIAN ASSISTANT

## 2023-11-16 LAB — CRYSTALS FLD MICRO: NORMAL

## 2023-11-20 LAB
BACTERIA FLD CULT: NORMAL
BACTERIA SPEC ANAEROBE CULT: NORMAL
GRAM STN SPEC: NORMAL

## 2023-12-01 ENCOUNTER — LAB REQUISITION (OUTPATIENT)
Dept: LAB | Facility: HOSPITAL | Age: 43
End: 2023-12-01
Payer: MEDICARE

## 2023-12-01 DIAGNOSIS — M25.461 EFFUSION, RIGHT KNEE: ICD-10-CM

## 2023-12-01 LAB
APPEARANCE FLD: ABNORMAL
COLOR FLD: ABNORMAL
LYMPHOCYTES NFR FLD MANUAL: 75 %
NEUTROPHILS NFR FLD MANUAL: 25 %
RBC # FLD AUTO: 1000 /MM3 (ref 0–200000)
WBC # FLD AUTO: 60 /MM3 (ref 0–1000)

## 2023-12-01 PROCEDURE — 89051 BODY FLUID CELL COUNT: CPT | Performed by: PHYSICIAN ASSISTANT

## 2023-12-03 ENCOUNTER — HOSPITAL ENCOUNTER (EMERGENCY)
Facility: HOSPITAL | Age: 43
Discharge: HOME | End: 2023-12-03
Attending: STUDENT IN AN ORGANIZED HEALTH CARE EDUCATION/TRAINING PROGRAM | Admitting: EMERGENCY MEDICINE
Payer: MEDICARE

## 2023-12-03 VITALS
WEIGHT: 231.2 LBS | DIASTOLIC BLOOD PRESSURE: 108 MMHG | TEMPERATURE: 97.8 F | SYSTOLIC BLOOD PRESSURE: 175 MMHG | BODY MASS INDEX: 35.04 KG/M2 | HEIGHT: 68 IN | HEART RATE: 87 BPM | OXYGEN SATURATION: 96 % | RESPIRATION RATE: 18 BRPM

## 2023-12-03 DIAGNOSIS — M25.562 ACUTE PAIN OF BOTH KNEES: Primary | ICD-10-CM

## 2023-12-03 DIAGNOSIS — M25.561 ACUTE PAIN OF BOTH KNEES: Primary | ICD-10-CM

## 2023-12-03 LAB
ALBUMIN SERPL-MCNC: 3.6 G/DL (ref 3.5–5.2)
ALBUMIN/GLOB SERPL: 1.3 G/DL
ALP SERPL-CCNC: 112 U/L (ref 39–117)
ALT SERPL W P-5'-P-CCNC: 23 U/L (ref 1–33)
ANION GAP SERPL CALCULATED.3IONS-SCNC: 7.5 MMOL/L (ref 5–15)
AST SERPL-CCNC: 17 U/L (ref 1–32)
BASOPHILS # BLD AUTO: 0.04 10*3/MM3 (ref 0–0.2)
BASOPHILS NFR BLD AUTO: 0.4 % (ref 0–1.5)
BILIRUB SERPL-MCNC: 0.3 MG/DL (ref 0–1.2)
BUN SERPL-MCNC: 11 MG/DL (ref 6–20)
BUN/CREAT SERPL: 13.1 (ref 7–25)
CALCIUM SPEC-SCNC: 9 MG/DL (ref 8.6–10.5)
CHLORIDE SERPL-SCNC: 105 MMOL/L (ref 98–107)
CO2 SERPL-SCNC: 26.5 MMOL/L (ref 22–29)
CREAT SERPL-MCNC: 0.84 MG/DL (ref 0.57–1)
CRP SERPL-MCNC: 0.92 MG/DL (ref 0–0.5)
DEPRECATED RDW RBC AUTO: 39.8 FL (ref 37–54)
EGFRCR SERPLBLD CKD-EPI 2021: 88 ML/MIN/1.73
EOSINOPHIL # BLD AUTO: 0.29 10*3/MM3 (ref 0–0.4)
EOSINOPHIL NFR BLD AUTO: 2.6 % (ref 0.3–6.2)
ERYTHROCYTE [DISTWIDTH] IN BLOOD BY AUTOMATED COUNT: 12.7 % (ref 12.3–15.4)
ERYTHROCYTE [SEDIMENTATION RATE] IN BLOOD: 6 MM/HR (ref 0–20)
GLOBULIN UR ELPH-MCNC: 2.8 GM/DL
GLUCOSE SERPL-MCNC: 119 MG/DL (ref 65–99)
HCT VFR BLD AUTO: 40.5 % (ref 34–46.6)
HGB BLD-MCNC: 14 G/DL (ref 12–15.9)
IMM GRANULOCYTES # BLD AUTO: 0.13 10*3/MM3 (ref 0–0.05)
IMM GRANULOCYTES NFR BLD AUTO: 1.2 % (ref 0–0.5)
LYMPHOCYTES # BLD AUTO: 2.05 10*3/MM3 (ref 0.7–3.1)
LYMPHOCYTES NFR BLD AUTO: 18.5 % (ref 19.6–45.3)
MCH RBC QN AUTO: 29.7 PG (ref 26.6–33)
MCHC RBC AUTO-ENTMCNC: 34.6 G/DL (ref 31.5–35.7)
MCV RBC AUTO: 85.8 FL (ref 79–97)
MONOCYTES # BLD AUTO: 0.82 10*3/MM3 (ref 0.1–0.9)
MONOCYTES NFR BLD AUTO: 7.4 % (ref 5–12)
NEUTROPHILS NFR BLD AUTO: 69.9 % (ref 42.7–76)
NEUTROPHILS NFR BLD AUTO: 7.73 10*3/MM3 (ref 1.7–7)
NRBC BLD AUTO-RTO: 0 /100 WBC (ref 0–0.2)
PLATELET # BLD AUTO: 207 10*3/MM3 (ref 140–450)
PMV BLD AUTO: 9.4 FL (ref 6–12)
POTASSIUM SERPL-SCNC: 4.2 MMOL/L (ref 3.5–5.2)
PROT SERPL-MCNC: 6.4 G/DL (ref 6–8.5)
RBC # BLD AUTO: 4.72 10*6/MM3 (ref 3.77–5.28)
SODIUM SERPL-SCNC: 139 MMOL/L (ref 136–145)
URATE SERPL-MCNC: 5.4 MG/DL (ref 2.4–5.7)
WBC NRBC COR # BLD AUTO: 11.06 10*3/MM3 (ref 3.4–10.8)

## 2023-12-03 PROCEDURE — 86038 ANTINUCLEAR ANTIBODIES: CPT

## 2023-12-03 PROCEDURE — 86235 NUCLEAR ANTIGEN ANTIBODY: CPT

## 2023-12-03 PROCEDURE — 80053 COMPREHEN METABOLIC PANEL: CPT

## 2023-12-03 PROCEDURE — 84550 ASSAY OF BLOOD/URIC ACID: CPT

## 2023-12-03 PROCEDURE — 36415 COLL VENOUS BLD VENIPUNCTURE: CPT

## 2023-12-03 PROCEDURE — 85652 RBC SED RATE AUTOMATED: CPT

## 2023-12-03 PROCEDURE — 86140 C-REACTIVE PROTEIN: CPT

## 2023-12-03 PROCEDURE — 85025 COMPLETE CBC W/AUTO DIFF WBC: CPT

## 2023-12-03 PROCEDURE — 86225 DNA ANTIBODY NATIVE: CPT

## 2023-12-03 PROCEDURE — 99282 EMERGENCY DEPT VISIT SF MDM: CPT

## 2023-12-03 PROCEDURE — 83516 IMMUNOASSAY NONANTIBODY: CPT

## 2023-12-04 NOTE — ED PROVIDER NOTES
Subjective:  History of Present Illness:    Patient is a 43-year-old female here today with bilateral knee pain.  She states that she has been dealing with her left meniscus for some time and has been seen by orthopedics.  Has received joint injections.  During this time she thinks that she is favoring her right leg which possibly caused the increase in pain that she is now noticing to her right knee.  She felt a popping/snapping sensation in her knee today.  She is unsure if she has damage to the ligament in that knee.  Patient states that she has had a recent joint fluid analysis, but is currently being evaluated for possible autoimmune issues causing joint pain.  This followed by [facility] in [facility].      Nurses Notes reviewed and agree, including vitals, allergies, social history and prior medical history.     REVIEW OF SYSTEMS: All systems reviewed and not pertinent unless noted.  Review of Systems    Past Medical History:   Diagnosis Date    Abnormal Pap smear of cervix     ADHD (attention deficit hyperactivity disorder)     Cervical dysplasia     Depression     Gallbladder cancer     found incidentally at the time of cholecystectomy.  Treated with chemo x 3 months prior to undergoing definitive liver resection with hepaticojejunoscopy and lymphadenectomy. treated at .     History of fracture     AS A CHILD RIGHT WRIST, RIGHT FOOT    HPV (human papilloma virus) infection     Hx antineoplastic chemo     Hyperlipidemia     Migraines     Multiple gestation     Panic disorder        Allergies:    Ciprofloxacin and Penicillins      Past Surgical History:   Procedure Laterality Date     SECTION      X1    CHOLECYSTECTOMY WITH COMMON BILE DUCT EXPLORATION  10/2014    COLONOSCOPY      COLONOSCOPY N/A 2018    Procedure: COLONOSCOPY;  Surgeon: Liz Carcamo MD;  Location: [facility] ENDOSCOPY;  Service: Gastroenterology    COLONOSCOPY N/A 10/16/2023    Procedure: COLONOSCOPY;   "Surgeon: Liz Carcamo MD;  Location: [facility] ENDOSCOPY;  Service: Gastroenterology;  Laterality: N/A;    ENDOSCOPY      ERCP      WITH STENT PLACEMENT FOR GALL STONE, PRIOR TO LAP CHOLEY AND DIAGNOSIS OF GB CANCER    LIVER RESECTION      for GB cancer, has hepaticojejunostomy    PORTACATH PLACEMENT      TUBAL ABDOMINAL LIGATION      VENOUS ACCESS DEVICE (PORT) REMOVAL      WISDOM TOOTH EXTRACTION           Social History     Socioeconomic History    Marital status:    Tobacco Use    Smoking status: Former     Packs/day: 0.50     Years: 10.00     Additional pack years: 0.00     Total pack years: 5.00     Types: Cigarettes, Electronic Cigarette     Quit date:      Years since quitting: 15.9    Smokeless tobacco: Never    Tobacco comments:     TRANSITIONED TO ECIG APPROXIMATELY 10 YEARS AGO.   Vaping Use    Vaping Use: Never used   Substance and Sexual Activity    Alcohol use: No    Drug use: No    Sexual activity: Defer         Family History   Problem Relation Age of Onset    Breast cancer Sister         DCIS    Heart failure Mother          at 74    Diabetes Mother     Hypertension Mother     Heart failure Father         treated fro SCC anal canal.  at 80.     Prostate cancer Father     Diabetes Father        Objective  Physical Exam:  BP (!) 175/108 (BP Location: Left arm, Patient Position: Sitting)   Pulse 87   Temp 97.8 °F (36.6 °C) (Oral)   Resp 18   Ht 172.7 cm (68\")   Wt 105 kg (231 lb 3.2 oz)   SpO2 96%   BMI 35.15 kg/m²      Physical Exam  Vitals and nursing note reviewed.   Constitutional:       General: She is not in acute distress.     Appearance: Normal appearance. She is obese. She is not ill-appearing.   HENT:      Head: Normocephalic and atraumatic.      Nose: Nose normal.   Cardiovascular:      Rate and Rhythm: Normal rate.      Pulses: Normal pulses.   Pulmonary:      Effort: Pulmonary effort is normal.   Musculoskeletal:      Right knee: Swelling present. Normal range " of motion. Tenderness present.      Left knee: Swelling present. Normal range of motion. Tenderness present.      Comments: More swelling and tenderness noted to the left knee medially.   Skin:     General: Skin is warm and dry.      Capillary Refill: Capillary refill takes less than 2 seconds.   Neurological:      General: No focal deficit present.      Mental Status: She is alert and oriented to person, place, and time.   Psychiatric:         Mood and Affect: Mood normal.         Behavior: Behavior normal.         Procedures    ED Course:         Lab Results (last 24 hours)       Procedure Component Value Units Date/Time    CBC & Differential [[id number]]  (Abnormal) Collected: 12/03/23 1953    Specimen: Blood Updated: 12/03/23 2000    Narrative:      The following orders were created for panel order CBC & Differential.  Procedure                               Abnormality         Status                     ---------                               -----------         ------                     CBC Auto Differential[[id number]]        Abnormal            Final result                 Please view results for these tests on the individual orders.    Comprehensive Metabolic Panel [[id number]]  (Abnormal) Collected: 12/03/23 1953    Specimen: Blood Updated: 12/03/23 2025     Glucose 119 mg/dL      BUN 11 mg/dL      Creatinine 0.84 mg/dL      Sodium 139 mmol/L      Potassium 4.2 mmol/L      Chloride 105 mmol/L      CO2 26.5 mmol/L      Calcium 9.0 mg/dL      Total Protein 6.4 g/dL      Albumin 3.6 g/dL      ALT (SGPT) 23 U/L      AST (SGOT) 17 U/L      Alkaline Phosphatase 112 U/L      Total Bilirubin 0.3 mg/dL      Globulin 2.8 gm/dL      A/G Ratio 1.3 g/dL      BUN/Creatinine Ratio 13.1     Anion Gap 7.5 mmol/L      eGFR 88.0 mL/min/1.73     Narrative:      GFR Normal >60  Chronic Kidney Disease <60  Kidney Failure <15      Sedimentation Rate [[id number]]  (Normal) Collected: 12/03/23 1953    Specimen: Blood Updated:  12/03/23 1959     Sed Rate 6 mm/hr     C-reactive Protein [[id number]]  (Abnormal) Collected: 12/03/23 1953    Specimen: Blood Updated: 12/03/23 2025     C-Reactive Protein 0.92 mg/dL     Uric Acid [[id number]]  (Normal) Collected: 12/03/23 1953    Specimen: Blood Updated: 12/03/23 2025     Uric Acid 5.4 mg/dL     ALEE Direct Reflex to 11 [employer] [[id number]] Collected: 12/03/23 1953    Specimen: Blood Updated: 12/03/23 1957    CBC Auto Differential [[id number]]  (Abnormal) Collected: 12/03/23 1953    Specimen: Blood Updated: 12/03/23 2000     WBC 11.06 10*3/mm3      RBC 4.72 10*6/mm3      Hemoglobin 14.0 g/dL      Hematocrit 40.5 %      MCV 85.8 fL      MCH 29.7 pg      MCHC 34.6 g/dL      RDW 12.7 %      RDW-SD 39.8 fl      MPV 9.4 fL      Platelets 207 10*3/mm3      Neutrophil % 69.9 %      Lymphocyte % 18.5 %      Monocyte % 7.4 %      Eosinophil % 2.6 %      Basophil % 0.4 %      Immature Grans % 1.2 %      Neutrophils, Absolute 7.73 10*3/mm3      Lymphocytes, Absolute 2.05 10*3/mm3      Monocytes, Absolute 0.82 10*3/mm3      Eosinophils, Absolute 0.29 10*3/mm3      Basophils, Absolute 0.04 10*3/mm3      Immature Grans, Absolute 0.13 10*3/mm3      nRBC 0.0 /100 WBC              No radiology results from the last 24 hrs       MDM     Amount and/or Complexity of Data Reviewed  Clinical lab tests: reviewed        Initial impression of presenting illness: Patient is a 43-year-old female here today with bilateral knee pain.    DDX: includes but is not limited to: Osteoarthritis, rheumatoid arthritis, ligament injury, joint effusions, among others    Patient arrives hemodynamically stable with vitals interpreted by myself.     Pertinent features from physical exam: Knee pain and swelling as described above..    Initial diagnostic plan: ALEE with reflex, CMP, CBC, CRP, sed rate, and uric acid.  No radiology testing needed tonight as the patient will ultimately need an MRI which is unobtainable this evening.    Results  from initial plan were reviewed and interpreted by me revealing white blood count of 11 and a CRP of 0.92.  Sed rate, CMP, and uric acid are normal.  ALEE with reflex is pending.    Diagnostic information from other sources: Medical record    Interventions / Re-evaluation: Patient was offered medication for pain but she denied need.    Results/clinical rationale were discussed with patient and advised her of her current results and pending results.  Orders placed for outpatient MRIs and suggested that she follow-up with her orthopedic provider for reevaluation.  Provided her with knee immobilizer for comfort and stability.    Consultations/Discussion of results with other physicians: None    Disposition plan: Patient discharged home in stable condition.  -----        Final diagnoses:   Acute pain of both knees          Adam Waldrop, APRN  12/03/23 9280

## 2023-12-04 NOTE — DISCHARGE INSTRUCTIONS
Your labs today do not show any concern for underlying infection.  As discussed your other autoimmune labs will return in the next 24+ hours.  Wear the knee braces for comfort.  Would recommend calling your orthopedic provider tomorrow to schedule a follow-up appointment.  The order has been placed to evaluate your knees via MRI.  You will be notified by the scheduling service.

## 2023-12-05 LAB
ANA SER QL: POSITIVE
CENTROMERE B AB SER-ACNC: 6.2 AI (ref 0–0.9)
CHROMATIN AB SERPL-ACNC: 0.2 AI (ref 0–0.9)
DSDNA AB SER-ACNC: 1 IU/ML (ref 0–9)
ENA JO1 AB SER-ACNC: <0.2 AI (ref 0–0.9)
ENA RNP AB SER-ACNC: <0.2 AI (ref 0–0.9)
ENA SCL70 AB SER-ACNC: <0.2 AI (ref 0–0.9)
ENA SM AB SER-ACNC: <0.2 AI (ref 0–0.9)
ENA SM+RNP AB SER-ACNC: <0.2 AI (ref 0–0.9)
ENA SS-A AB SER-ACNC: <0.2 AI (ref 0–0.9)
ENA SS-B AB SER-ACNC: <0.2 AI (ref 0–0.9)
Lab: ABNORMAL
RIBOSOMAL P AB SER-ACNC: <0.2 AI (ref 0–0.9)

## 2023-12-05 NOTE — PROGRESS NOTES
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 Patient had known positive ALEE and neg RF, but no further testing had been done to evaluate her knee swelling. Recommend follow-up with PCP for referral to a rheumatologist for a further evaluation.

## 2023-12-13 ENCOUNTER — TRANSCRIBE ORDERS (OUTPATIENT)
Dept: ADMINISTRATIVE | Facility: HOSPITAL | Age: 43
End: 2023-12-13
Payer: MEDICARE

## 2023-12-13 DIAGNOSIS — M25.562 LEFT KNEE PAIN, UNSPECIFIED CHRONICITY: ICD-10-CM

## 2023-12-13 DIAGNOSIS — M25.561 RIGHT KNEE PAIN, UNSPECIFIED CHRONICITY: Primary | ICD-10-CM

## 2023-12-13 DIAGNOSIS — M25.562 LEFT KNEE PAIN, UNSPECIFIED CHRONICITY: Primary | ICD-10-CM

## 2024-02-05 ENCOUNTER — APPOINTMENT (OUTPATIENT)
Dept: CT IMAGING | Facility: HOSPITAL | Age: 44
End: 2024-02-05
Payer: MEDICARE

## 2024-02-05 ENCOUNTER — HOSPITAL ENCOUNTER (EMERGENCY)
Facility: HOSPITAL | Age: 44
Discharge: HOME | End: 2024-02-06
Attending: EMERGENCY MEDICINE | Admitting: EMERGENCY MEDICINE
Payer: MEDICARE

## 2024-02-05 VITALS
OXYGEN SATURATION: 95 % | DIASTOLIC BLOOD PRESSURE: 73 MMHG | WEIGHT: 231 LBS | HEART RATE: 81 BPM | RESPIRATION RATE: 16 BRPM | HEIGHT: 68 IN | TEMPERATURE: 98 F | SYSTOLIC BLOOD PRESSURE: 117 MMHG | BODY MASS INDEX: 35.01 KG/M2

## 2024-02-05 DIAGNOSIS — R10.9 LEFT FLANK PAIN: Primary | ICD-10-CM

## 2024-02-05 LAB
ALBUMIN SERPL-MCNC: 4.6 G/DL (ref 3.5–5.2)
ALBUMIN/GLOB SERPL: 1.2 G/DL
ALP SERPL-CCNC: 111 U/L (ref 39–117)
ALT SERPL W P-5'-P-CCNC: 28 U/L (ref 1–33)
ANION GAP SERPL CALCULATED.3IONS-SCNC: 9.3 MMOL/L (ref 5–15)
AST SERPL-CCNC: 22 U/L (ref 1–32)
B-HCG UR QL: NEGATIVE
BACTERIA UR QL AUTO: ABNORMAL /HPF
BASOPHILS # BLD AUTO: 0.03 10*3/MM3 (ref 0–0.2)
BASOPHILS NFR BLD AUTO: 0.3 % (ref 0–1.5)
BILIRUB SERPL-MCNC: 0.4 MG/DL (ref 0–1.2)
BILIRUB UR QL STRIP: NEGATIVE
BUN SERPL-MCNC: 10 MG/DL (ref 6–20)
BUN/CREAT SERPL: 9.5 (ref 7–25)
CALCIUM SPEC-SCNC: 9.5 MG/DL (ref 8.6–10.5)
CHLORIDE SERPL-SCNC: 95 MMOL/L (ref 98–107)
CLARITY UR: CLEAR
CO2 SERPL-SCNC: 29.7 MMOL/L (ref 22–29)
COLOR UR: ABNORMAL
CREAT SERPL-MCNC: 1.05 MG/DL (ref 0.57–1)
DEPRECATED RDW RBC AUTO: 39.7 FL (ref 37–54)
EGFRCR SERPLBLD CKD-EPI 2021: 67.3 ML/MIN/1.73
EOSINOPHIL # BLD AUTO: 0.19 10*3/MM3 (ref 0–0.4)
EOSINOPHIL NFR BLD AUTO: 1.6 % (ref 0.3–6.2)
ERYTHROCYTE [DISTWIDTH] IN BLOOD BY AUTOMATED COUNT: 12.5 % (ref 12.3–15.4)
GLOBULIN UR ELPH-MCNC: 3.8 GM/DL
GLUCOSE SERPL-MCNC: 123 MG/DL (ref 65–99)
GLUCOSE UR STRIP-MCNC: NEGATIVE MG/DL
HCT VFR BLD AUTO: 48.2 % (ref 34–46.6)
HGB BLD-MCNC: 16.7 G/DL (ref 12–15.9)
HGB UR QL STRIP.AUTO: ABNORMAL
HOLD SPECIMEN: NORMAL
HOLD SPECIMEN: NORMAL
HYALINE CASTS UR QL AUTO: ABNORMAL /LPF
IMM GRANULOCYTES # BLD AUTO: 0.09 10*3/MM3 (ref 0–0.05)
IMM GRANULOCYTES NFR BLD AUTO: 0.8 % (ref 0–0.5)
KETONES UR QL STRIP: ABNORMAL
LEUKOCYTE ESTERASE UR QL STRIP.AUTO: NEGATIVE
LIPASE SERPL-CCNC: 30 U/L (ref 13–60)
LYMPHOCYTES # BLD AUTO: 1.24 10*3/MM3 (ref 0.7–3.1)
LYMPHOCYTES NFR BLD AUTO: 10.4 % (ref 19.6–45.3)
MCH RBC QN AUTO: 30.1 PG (ref 26.6–33)
MCHC RBC AUTO-ENTMCNC: 34.6 G/DL (ref 31.5–35.7)
MCV RBC AUTO: 86.8 FL (ref 79–97)
MONOCYTES # BLD AUTO: 0.39 10*3/MM3 (ref 0.1–0.9)
MONOCYTES NFR BLD AUTO: 3.3 % (ref 5–12)
NEUTROPHILS NFR BLD AUTO: 10.02 10*3/MM3 (ref 1.7–7)
NEUTROPHILS NFR BLD AUTO: 83.6 % (ref 42.7–76)
NITRITE UR QL STRIP: NEGATIVE
NRBC BLD AUTO-RTO: 0 /100 WBC (ref 0–0.2)
PH UR STRIP.AUTO: 6 [PH] (ref 5–8)
PLATELET # BLD AUTO: 288 10*3/MM3 (ref 140–450)
PMV BLD AUTO: 9.3 FL (ref 6–12)
POTASSIUM SERPL-SCNC: 3.4 MMOL/L (ref 3.5–5.2)
PROT SERPL-MCNC: 8.4 G/DL (ref 6–8.5)
PROT UR QL STRIP: NEGATIVE
RBC # BLD AUTO: 5.55 10*6/MM3 (ref 3.77–5.28)
RBC # UR STRIP: ABNORMAL /HPF
REF LAB TEST METHOD: ABNORMAL
SODIUM SERPL-SCNC: 134 MMOL/L (ref 136–145)
SP GR UR STRIP: 1.03 (ref 1–1.03)
SQUAMOUS #/AREA URNS HPF: ABNORMAL /HPF
UROBILINOGEN UR QL STRIP: ABNORMAL
WBC # UR STRIP: ABNORMAL /HPF
WBC NRBC COR # BLD AUTO: 11.96 10*3/MM3 (ref 3.4–10.8)
WHOLE BLOOD HOLD COAG: NORMAL
WHOLE BLOOD HOLD SPECIMEN: NORMAL

## 2024-02-05 PROCEDURE — 81025 URINE PREGNANCY TEST: CPT | Performed by: EMERGENCY MEDICINE

## 2024-02-05 PROCEDURE — 25810000003 SODIUM CHLORIDE 0.9 % SOLUTION: Performed by: EMERGENCY MEDICINE

## 2024-02-05 PROCEDURE — 74176 CT ABD & PELVIS W/O CONTRAST: CPT

## 2024-02-05 PROCEDURE — 36415 COLL VENOUS BLD VENIPUNCTURE: CPT

## 2024-02-05 PROCEDURE — 96374 THER/PROPH/DIAG INJ IV PUSH: CPT

## 2024-02-05 PROCEDURE — 80053 COMPREHEN METABOLIC PANEL: CPT | Performed by: EMERGENCY MEDICINE

## 2024-02-05 PROCEDURE — 85025 COMPLETE CBC W/AUTO DIFF WBC: CPT | Performed by: EMERGENCY MEDICINE

## 2024-02-05 PROCEDURE — 25010000002 KETOROLAC TROMETHAMINE PER 15 MG: Performed by: EMERGENCY MEDICINE

## 2024-02-05 PROCEDURE — 83690 ASSAY OF LIPASE: CPT | Performed by: EMERGENCY MEDICINE

## 2024-02-05 PROCEDURE — 99284 EMERGENCY DEPT VISIT MOD MDM: CPT

## 2024-02-05 PROCEDURE — 81001 URINALYSIS AUTO W/SCOPE: CPT | Performed by: EMERGENCY MEDICINE

## 2024-02-05 PROCEDURE — 96361 HYDRATE IV INFUSION ADD-ON: CPT

## 2024-02-05 RX ORDER — KETOROLAC TROMETHAMINE 30 MG/ML
15 INJECTION, SOLUTION INTRAMUSCULAR; INTRAVENOUS ONCE
Status: COMPLETED | OUTPATIENT
Start: 2024-02-05 | End: 2024-02-05

## 2024-02-05 RX ORDER — SODIUM CHLORIDE 0.9 % (FLUSH) 0.9 %
10 SYRINGE (ML) INJECTION AS NEEDED
Status: DISCONTINUED | OUTPATIENT
Start: 2024-02-05 | End: 2024-02-06 | Stop reason: HOSPADM

## 2024-02-05 RX ORDER — ACETAMINOPHEN 325 MG/1
650 TABLET ORAL EVERY 6 HOURS PRN
Status: DISCONTINUED | OUTPATIENT
Start: 2024-02-05 | End: 2024-02-06 | Stop reason: HOSPADM

## 2024-02-05 RX ADMIN — KETOROLAC TROMETHAMINE 15 MG: 30 INJECTION, SOLUTION INTRAMUSCULAR; INTRAVENOUS at 22:30

## 2024-02-05 RX ADMIN — SODIUM CHLORIDE 1000 ML: 9 INJECTION, SOLUTION INTRAVENOUS at 22:31

## 2024-02-05 RX ADMIN — ACETAMINOPHEN 650 MG: 325 TABLET, FILM COATED ORAL at 22:31

## 2024-02-06 NOTE — DISCHARGE INSTRUCTIONS
Discussed, your CT scan showed an abnormality about your intestine.  It is important for you to follow-up with your cancer doctor at the [facility] regarding this within 1 month.  You need to return emerged part immediately for vomiting, abdominal pain, not passing gas or having bowel movements, concerning symptoms, worsening symptoms or new concerns.

## 2024-02-06 NOTE — ED PROVIDER NOTES
EMERGENCY DEPARTMENT ENCOUNTER    Pt Name: aPo Modi  MRN: [id number]  Pt :   1980  Room Number:    Date of encounter:  2024  PCP: Zina Wu APRN  ED Provider: Rob Connolly MD    Historian: Patient      HPI:  Chief Complaint: Back pain    Patient is a 43-year-old female with a chief complaint of back pain.  She has a past history significant for gallbladder cancer requiring partial liver resection.  She says that since last Wednesday she has had low back pain.  She says it radiates to the left flank into the left lower quadrant of her abdomen.  She says she was taking Tylenol at home without improvement.  She denies having fever, chills, night sweats or unintentional weight loss.  She says that she was not doing anything in particular at the time of the onset of the pain.  She denies having any associated trauma.  She denies any radiation of pain to her lower extremities.  She denies dysuria, polyuria or urinary hesitancy.  She says she was seen in urgent care [facility] and was instructed to come here for further evaluation.        PAST MEDICAL HISTORY  Past Medical History:   Diagnosis Date    Abnormal Pap smear of cervix     ADHD (attention deficit hyperactivity disorder)     Cervical dysplasia     Depression     Gallbladder cancer     found incidentally at the time of cholecystectomy.  Treated with chemo x 3 months prior to undergoing definitive liver resection with hepaticojejunoscopy and lymphadenectomy. treated at .     History of fracture     AS A CHILD RIGHT WRIST, RIGHT FOOT    HPV (human papilloma virus) infection     Hx antineoplastic chemo     Hyperlipidemia     Migraines     Multiple gestation     Panic disorder          PAST SURGICAL HISTORY  Past Surgical History:   Procedure Laterality Date     SECTION      X1    CHOLECYSTECTOMY WITH COMMON BILE DUCT EXPLORATION  10/2014    COLONOSCOPY      COLONOSCOPY N/A 2018    Procedure: COLONOSCOPY;  Surgeon: Liz  Mulu Carcamo MD;  Location: [facility] ENDOSCOPY;  Service: Gastroenterology    COLONOSCOPY N/A 10/16/2023    Procedure: COLONOSCOPY;  Surgeon: Liz Carcamo MD;  Location: [facility] ENDOSCOPY;  Service: Gastroenterology;  Laterality: N/A;    ENDOSCOPY      ERCP      WITH STENT PLACEMENT FOR GALL STONE, PRIOR TO LAP CHOLEY AND DIAGNOSIS OF GB CANCER    LIVER RESECTION      for GB cancer, has hepaticojejunostomy    PORTACATH PLACEMENT      TUBAL ABDOMINAL LIGATION      VENOUS ACCESS DEVICE (PORT) REMOVAL      WISDOM TOOTH EXTRACTION           FAMILY HISTORY  Family History   Problem Relation Age of Onset    Breast cancer Sister         DCIS    Heart failure Mother          at 74    Diabetes Mother     Hypertension Mother     Heart failure Father         treated fro SCC anal canal.  at 80.     Prostate cancer Father     Diabetes Father          SOCIAL HISTORY  Social History     Socioeconomic History    Marital status:    Tobacco Use    Smoking status: Former     Packs/day: 0.50     Years: 10.00     Additional pack years: 0.00     Total pack years: 5.00     Types: Cigarettes, Electronic Cigarette     Quit date:      Years since quittin.1    Smokeless tobacco: Never    Tobacco comments:     TRANSITIONED TO ECIG APPROXIMATELY 10 YEARS AGO.   Vaping Use    Vaping Use: Never used   Substance and Sexual Activity    Alcohol use: No    Drug use: No    Sexual activity: Defer         ALLERGIES  Ciprofloxacin and Penicillins        REVIEW OF SYSTEMS  Review of Systems       All systems reviewed and negative except for those discussed in HPI.       PHYSICAL EXAM    I have reviewed the triage vital signs and nursing notes.    ED Triage Vitals [24]   Temp Heart Rate Resp BP SpO2   98 °F (36.7 °C) 92 16 140/89 100 %      Temp src Heart Rate Source Patient Position BP Location FiO2 (%)   Oral Monitor Sitting Left arm --       General: no acute distress  Skin: normal color, warm and dry  Head:  normocephalic, atraumatic  Eyes: Pupils equally round and reactive to light, extra-ocular movements intact.  Nose: normal nasal mucosa, no visible deformity.  Mouth: moist mucous membranes.  Neck: supple.  Chest: no retractions, no visible deformity  Cardiovascular: Regular rate and rhythm.  Lungs: clear to auscultation bilaterally.  Back: no contusions, wounds or abrasions.  No midline thoracic or lumbar spine tenderness.  No CVA tenderness bilaterally.  Abdomen: soft, non-tender, non-distended. No rebound tenderness, no guarding.  No peritonitis.  Specifically, no tenderness to palpation in the left lower quadrant.  Neuro: alert and oriented x3, no focal neurological deficits.  Psych: appropriate mood and behavior.      LAB RESULTS  No results found for this or any previous visit (from the past 24 hour(s)).    If labs were ordered, I independently reviewed the results and considered them in treating the patient.    Labs show mild hyponatremia and mild hypokalemia.  She had a slight leukocytosis.  Urinalysis is negative for infection and UPT is negative.        RADIOLOGY  No Radiology Exams Resulted Within Past 24 Hours    CT scan of the abdomen pelvis was obtained and per the tele-radiologist demonstrates endings that may show a chronic small bowel obstruction.  The patient's scan is otherwise negative for acute findings per the radiologist.    Patient has no findings to suggest bowel obstruction.  She has not had any vomiting, tolerating p.o. and is having normal bowel movements.  She says her last bowel movement was today and she has been passing flatus.  Her abdomen is not distended.    PROCEDURES    Procedures    No orders to display       MEDICATIONS GIVEN IN ER    Medications   sodium chloride 0.9 % flush 10 mL (has no administration in time range)   sodium chloride 0.9 % bolus 1,000 mL (has no administration in time range)   ketorolac (TORADOL) injection 15 mg (has no administration in time range)    acetaminophen (TYLENOL) tablet 650 mg (has no administration in time range)         MEDICAL DECISION MAKING, PROGRESS, and CONSULTS    All labs, if obtained, have been independently reviewed by me.  All radiology studies, if obtained, have been reviewed by me and the radiologist dictating the report.  All EKG's, if obtained, have been independently viewed and interpreted by me/my attending physician.      Discussion below represents my analysis of pertinent findings related to patient's condition, differential diagnosis, treatment plan and final disposition.          Differential diagnosis:    Differential diagnosis for this patient includes cystitis, pyelonephritis, ectopic pregnancy, torsion, diverticulitis, intra-abdominal surgical emergency, obstruction, musculoskeletal pain, other acute emergency.    Patient's vitals are reviewed and are normal.    Patient has no findings to suggest bowel obstruction.  She has not had any vomiting, tolerating p.o. and is having normal bowel movements.  She says her last bowel movement was today and she has been passing flatus.  Her abdomen is not distended.    Other than her CT scan demonstrating possible chronic bowel obstruction her CT is otherwise negative for acute findings.  I have informed the patient of the CT scan finding and the need to discuss it with her provider who treated her for her gallbladder cancer at the [facility].    I am uncertain as to the etiology of her pain however given her reassuring labs, urinalysis negative for infection and nonacute CT scan I think she is appropriate for outpatient follow-up.  She is instructed to return to the department for new concerning symptoms, worsening symptoms or new concerns.        Orders placed during this visit:  Orders Placed This Encounter   Procedures    [facility] Draw    Comprehensive Metabolic Panel    Lipase    Urinalysis With Microscopic If Indicated (No Culture) - Urine, Clean Catch    Pregnancy,  Urine - Urine, Clean Catch    Insert Peripheral IV    Green Top (Gel)    Lavender Top    Gold Top - SST    Light Blue Top    CBC & Differential           AS OF 21:59 EST VITALS:    BP - 140/89  HR - 92  TEMP - 98 °F (36.7 °C) (Oral)  O2 SATS - 100%                  DIAGNOSIS  Final diagnoses:   None         DISPOSITION  Discharged home      Please note that portions of this document were completed with voice recognition software.        Rob Connolly MD  02/06/24 0151

## 2024-02-13 ENCOUNTER — TELEPHONE (OUTPATIENT)
Dept: OBSTETRICS AND GYNECOLOGY | Facility: CLINIC | Age: 44
End: 2024-02-13

## 2024-02-14 ENCOUNTER — OFFICE VISIT (OUTPATIENT)
Dept: OBSTETRICS AND GYNECOLOGY | Facility: CLINIC | Age: 44
End: 2024-02-14
Payer: MEDICARE

## 2024-02-14 VITALS
HEIGHT: 68 IN | SYSTOLIC BLOOD PRESSURE: 118 MMHG | BODY MASS INDEX: 34.86 KG/M2 | DIASTOLIC BLOOD PRESSURE: 72 MMHG | WEIGHT: 230 LBS

## 2024-02-14 DIAGNOSIS — D25.9 UTERINE LEIOMYOMA, UNSPECIFIED LOCATION: ICD-10-CM

## 2024-02-14 DIAGNOSIS — N93.9 ABNORMAL UTERINE BLEEDING (AUB): Primary | ICD-10-CM

## 2024-02-14 DIAGNOSIS — Z98.51 H/O TUBAL LIGATION: ICD-10-CM

## 2024-02-14 DIAGNOSIS — Z98.891 PREVIOUS CESAREAN SECTION: ICD-10-CM

## 2024-02-14 DIAGNOSIS — N92.1 MENORRHAGIA WITH IRREGULAR CYCLE: ICD-10-CM

## 2024-02-14 DIAGNOSIS — Z98.890 HISTORY OF ENDOMETRIAL ABLATION: ICD-10-CM

## 2024-02-14 PROCEDURE — 99213 OFFICE O/P EST LOW 20 MIN: CPT | Performed by: OBSTETRICS & GYNECOLOGY

## 2024-02-20 PROBLEM — D25.9 UTERINE LEIOMYOMA: Status: ACTIVE | Noted: 2024-02-20

## 2024-02-20 NOTE — PROGRESS NOTES
GYN Office Visit    Subjective   Chief Complaint   Patient presents with    Follow-up     ED follow up for fibroids     Pao Modi is a 43 y.o. year old  presenting for follow-up bleeding + fibroids.    She was initially scheduled for hysterectomy in , but changed her mind.  She reports ongoing issues with bleeding since that time.  She was recently seen in the emergency room for bleeding and pain.  She would like to discuss treatment options again today. She does have a history of endometrial ablation in .  She had very heavy bleeding requiring blood transfusions prior to the ablation.  She was never amenorrheic after the ablation procedure.  In 2014/15, she had two large surgeries for gallbladder cancer.  She did receive chemotherapy in  and was amenorrheic for 8 months or so.  She does have pain and bloating with her bleeding.  Previous tubal ligation. Her bleeding symptoms affect her ability to participate in daily life activities.     She denies sexual dysfunction. She denies urinary complaints including incontinence.     OB Hx: 3 vaginal births, 1   Contraception: Tubal ligation  Pap smear: NEG Cotesting   Mammogram: , sister in her 40s had breast cancer, negative genetic testing  Colonoscopy:   DEXA Scan: Never    Past Medical History:   Diagnosis Date    Abnormal Pap smear of cervix     ADHD (attention deficit hyperactivity disorder)     Cervical dysplasia     Depression     Gallbladder cancer     found incidentally at the time of cholecystectomy.  Treated with chemo x 3 months prior to undergoing definitive liver resection with hepaticojejunoscopy and lymphadenectomy. treated at .     History of fracture     AS A CHILD RIGHT WRIST, RIGHT FOOT    HPV (human papilloma virus) infection     Hx antineoplastic chemo     Hyperlipidemia     Migraines     Multiple gestation     Panic disorder        Past Surgical History:   Procedure Laterality Date     SECTION       X1    CHOLECYSTECTOMY WITH COMMON BILE DUCT EXPLORATION  10/2014    COLONOSCOPY      COLONOSCOPY N/A 2018    Procedure: COLONOSCOPY;  Surgeon: Liz Carcamo MD;  Location: [facility] ENDOSCOPY;  Service: Gastroenterology    COLONOSCOPY N/A 10/16/2023    Procedure: COLONOSCOPY;  Surgeon: Liz Carcamo MD;  Location: [facility] ENDOSCOPY;  Service: Gastroenterology;  Laterality: N/A;    ENDOSCOPY      ERCP      WITH STENT PLACEMENT FOR GALL STONE, PRIOR TO LAP CHOLEY AND DIAGNOSIS OF GB CANCER    LIVER RESECTION      for GB cancer, has hepaticojejunostomy    PORTACATH PLACEMENT      TUBAL ABDOMINAL LIGATION      VENOUS ACCESS DEVICE (PORT) REMOVAL      WISDOM TOOTH EXTRACTION         Family History   Problem Relation Age of Onset    Breast cancer Sister         DCIS    Heart failure Mother          at 74    Diabetes Mother     Hypertension Mother     Heart failure Father         treated fro SCC anal canal.  at 80.     Prostate cancer Father     Diabetes Father         Social History     Tobacco Use    Smoking status: Former     Packs/day: 0.50     Years: 10.00     Additional pack years: 0.00     Total pack years: 5.00     Types: Cigarettes, Electronic Cigarette     Quit date:      Years since quittin.1    Smokeless tobacco: Never    Tobacco comments:     TRANSITIONED TO ECIG APPROXIMATELY 10 YEARS AGO.   Vaping Use    Vaping Use: Never used   Substance Use Topics    Alcohol use: No    Drug use: No       (Not in a hospital admission)      Ciprofloxacin and Penicillins    Current Outpatient Medications on File Prior to Visit   Medication Sig Dispense Refill    amphetamine-dextroamphetamine (ADDERALL) 30 MG tablet       buPROPion XL (WELLBUTRIN XL) 150 MG 24 hr tablet       buPROPion XL (WELLBUTRIN XL) 300 MG 24 hr tablet Take 1 tablet by mouth Every Morning.  0    clonazePAM (KlonoPIN) 0.5 MG tablet Take 1 tablet by mouth 2 (Two) Times a Day As Needed for Anxiety.      furosemide  "(LASIX) 20 MG tablet take 1 tablet by mouth once daily if needed  0    indomethacin (INDOCIN) 25 MG capsule Take 1 capsule by mouth 3 (Three) Times a Day As Needed for Mild Pain . 20 capsule 0    methocarbamol (ROBAXIN) 750 MG tablet       ondansetron (ZOFRAN) 4 MG tablet       sertraline (ZOLOFT) 50 MG tablet       ubrogepant tablet TAKE ONE TABLET AT ONSET OF HEADACHE. MAY REPEAT IN 2 HOURS IF NEEDED. MAX DOSE IN 24 HOURS      permethrin (ELIMITE) 5 % cream Apply  topically to the appropriate area as directed 1 (One) Time for 1 dose. Apply from neck down. Leave on overnight. 180 g 1     No current facility-administered medications on file prior to visit.       Social History    Tobacco Use      Smoking status: Former        Packs/day: 0.50        Years: 10.00        Additional pack years: 0.00        Total pack years: 5.00        Types: Cigarettes, Electronic Cigarette        Quit date:         Years since quittin.1      Smokeless tobacco: Never      Tobacco comments: TRANSITIONED TO ECIG APPROXIMATELY 10 YEARS AGO.         Objective   /72   Ht 172.7 cm (68\")   Wt 104 kg (230 lb)   LMP 2024   BMI 34.97 kg/m²     Physical Exam:  General Appearance: alert, pleasant, appears stated age, interactive and cooperative         Medical Decision Making:    I reviewed my most recent note and the pelvic ultrasound from .    Assessment   Abnormal uterine bleeding  Menorrhagia with irregular cycle  Pelvic pain and bloating  Previous endometrial ablation and tubal ligation  Uterine fibroids     Plan    No orders of the defined types were placed in this encounter.      Medication Management: None    Procedures Performed: None    We reviewed her situation in detail today.  We discussed hysterectomy vs hysteroscopy + Cerene ablation.  She will return for a follow-up ultrasound next week and ongoing discussion of treatment options.  All questions answered.    Edgar Chavez MD  Obstetrics and " Gynecology  [facility]

## 2024-02-22 ENCOUNTER — OFFICE VISIT (OUTPATIENT)
Dept: OBSTETRICS AND GYNECOLOGY | Facility: CLINIC | Age: 44
End: 2024-02-22
Payer: MEDICARE

## 2024-02-22 ENCOUNTER — PRE-ADMISSION TESTING (OUTPATIENT)
Dept: PREADMISSION TESTING | Facility: HOSPITAL | Age: 44
End: 2024-02-22
Payer: MEDICARE

## 2024-02-22 ENCOUNTER — PREP FOR SURGERY (OUTPATIENT)
Dept: OTHER | Facility: HOSPITAL | Age: 44
End: 2024-02-22
Payer: MEDICARE

## 2024-02-22 VITALS — HEIGHT: 68 IN | BODY MASS INDEX: 34.4 KG/M2 | WEIGHT: 227 LBS

## 2024-02-22 VITALS — WEIGHT: 227 LBS | BODY MASS INDEX: 34.4 KG/M2 | HEIGHT: 68 IN

## 2024-02-22 DIAGNOSIS — Z98.890 HISTORY OF ENDOMETRIAL ABLATION: ICD-10-CM

## 2024-02-22 DIAGNOSIS — N93.9 ABNORMAL UTERINE BLEEDING (AUB): Primary | ICD-10-CM

## 2024-02-22 DIAGNOSIS — R10.2 PELVIC PAIN: ICD-10-CM

## 2024-02-22 DIAGNOSIS — N92.1 MENORRHAGIA WITH IRREGULAR CYCLE: ICD-10-CM

## 2024-02-22 DIAGNOSIS — N93.9 ABNORMAL UTERINE BLEEDING (AUB): ICD-10-CM

## 2024-02-22 DIAGNOSIS — D25.9 UTERINE LEIOMYOMA, UNSPECIFIED LOCATION: ICD-10-CM

## 2024-02-22 LAB
ABO GROUP BLD: NORMAL
ANION GAP SERPL CALCULATED.3IONS-SCNC: 7.2 MMOL/L (ref 5–15)
B-HCG UR QL: NEGATIVE
BASOPHILS # BLD AUTO: 0.04 10*3/MM3 (ref 0–0.2)
BASOPHILS NFR BLD AUTO: 0.3 % (ref 0–1.5)
BILIRUB UR QL STRIP: NEGATIVE
BUN SERPL-MCNC: 9 MG/DL (ref 6–20)
BUN/CREAT SERPL: 11.4 (ref 7–25)
CALCIUM SPEC-SCNC: 9.3 MG/DL (ref 8.6–10.5)
CHLORIDE SERPL-SCNC: 95 MMOL/L (ref 98–107)
CLARITY UR: CLEAR
CO2 SERPL-SCNC: 32.8 MMOL/L (ref 22–29)
COLOR UR: YELLOW
CREAT SERPL-MCNC: 0.79 MG/DL (ref 0.57–1)
DEPRECATED RDW RBC AUTO: 37.9 FL (ref 37–54)
EGFRCR SERPLBLD CKD-EPI 2021: 94.7 ML/MIN/1.73
EOSINOPHIL # BLD AUTO: 0.35 10*3/MM3 (ref 0–0.4)
EOSINOPHIL NFR BLD AUTO: 2.9 % (ref 0.3–6.2)
ERYTHROCYTE [DISTWIDTH] IN BLOOD BY AUTOMATED COUNT: 12.4 % (ref 12.3–15.4)
GLUCOSE SERPL-MCNC: 107 MG/DL (ref 65–99)
GLUCOSE UR STRIP-MCNC: NEGATIVE MG/DL
HCT VFR BLD AUTO: 44.7 % (ref 34–46.6)
HGB BLD-MCNC: 15.6 G/DL (ref 12–15.9)
HGB UR QL STRIP.AUTO: NEGATIVE
IMM GRANULOCYTES # BLD AUTO: 0.09 10*3/MM3 (ref 0–0.05)
IMM GRANULOCYTES NFR BLD AUTO: 0.7 % (ref 0–0.5)
KETONES UR QL STRIP: NEGATIVE
LEUKOCYTE ESTERASE UR QL STRIP.AUTO: NEGATIVE
LYMPHOCYTES # BLD AUTO: 2.08 10*3/MM3 (ref 0.7–3.1)
LYMPHOCYTES NFR BLD AUTO: 17.2 % (ref 19.6–45.3)
MCH RBC QN AUTO: 29.7 PG (ref 26.6–33)
MCHC RBC AUTO-ENTMCNC: 34.9 G/DL (ref 31.5–35.7)
MCV RBC AUTO: 85 FL (ref 79–97)
MONOCYTES # BLD AUTO: 0.84 10*3/MM3 (ref 0.1–0.9)
MONOCYTES NFR BLD AUTO: 6.9 % (ref 5–12)
NEUTROPHILS NFR BLD AUTO: 72 % (ref 42.7–76)
NEUTROPHILS NFR BLD AUTO: 8.7 10*3/MM3 (ref 1.7–7)
NITRITE UR QL STRIP: NEGATIVE
NRBC BLD AUTO-RTO: 0 /100 WBC (ref 0–0.2)
PH UR STRIP.AUTO: 6 [PH] (ref 5–8)
PLATELET # BLD AUTO: 262 10*3/MM3 (ref 140–450)
PMV BLD AUTO: 9.5 FL (ref 6–12)
POTASSIUM SERPL-SCNC: 3.2 MMOL/L (ref 3.5–5.2)
PROT UR QL STRIP: NEGATIVE
RBC # BLD AUTO: 5.26 10*6/MM3 (ref 3.77–5.28)
RH BLD: POSITIVE
SODIUM SERPL-SCNC: 135 MMOL/L (ref 136–145)
SP GR UR STRIP: 1.02 (ref 1–1.03)
UROBILINOGEN UR QL STRIP: NORMAL
WBC NRBC COR # BLD AUTO: 12.1 10*3/MM3 (ref 3.4–10.8)

## 2024-02-22 PROCEDURE — 86901 BLOOD TYPING SEROLOGIC RH(D): CPT

## 2024-02-22 PROCEDURE — 99214 OFFICE O/P EST MOD 30 MIN: CPT | Performed by: OBSTETRICS & GYNECOLOGY

## 2024-02-22 PROCEDURE — 36415 COLL VENOUS BLD VENIPUNCTURE: CPT

## 2024-02-22 PROCEDURE — 81025 URINE PREGNANCY TEST: CPT

## 2024-02-22 PROCEDURE — 81003 URINALYSIS AUTO W/O SCOPE: CPT

## 2024-02-22 PROCEDURE — 85025 COMPLETE CBC W/AUTO DIFF WBC: CPT

## 2024-02-22 PROCEDURE — 86900 BLOOD TYPING SEROLOGIC ABO: CPT

## 2024-02-22 PROCEDURE — 80048 BASIC METABOLIC PNL TOTAL CA: CPT

## 2024-02-22 RX ORDER — CEFAZOLIN SODIUM 2 G/50ML
2000 SOLUTION INTRAVENOUS ONCE
Status: CANCELLED | OUTPATIENT
Start: 2024-02-22 | End: 2024-02-22

## 2024-02-22 RX ORDER — HYDROXYCHLOROQUINE SULFATE 200 MG/1
200 TABLET, FILM COATED ORAL DAILY
COMMUNITY

## 2024-02-22 RX ORDER — LEFLUNOMIDE 10 MG/1
10 TABLET ORAL DAILY
COMMUNITY

## 2024-02-22 RX ORDER — HYDROCHLOROTHIAZIDE 25 MG/1
25 TABLET ORAL DAILY
COMMUNITY

## 2024-02-22 RX ORDER — SODIUM CHLORIDE 0.9 % (FLUSH) 0.9 %
10 SYRINGE (ML) INJECTION AS NEEDED
Status: CANCELLED | OUTPATIENT
Start: 2024-02-22

## 2024-02-22 RX ORDER — SODIUM CHLORIDE 9 MG/ML
40 INJECTION, SOLUTION INTRAVENOUS AS NEEDED
Status: CANCELLED | OUTPATIENT
Start: 2024-02-22

## 2024-02-22 RX ORDER — PHENAZOPYRIDINE HYDROCHLORIDE 100 MG/1
200 TABLET, FILM COATED ORAL ONCE
Status: CANCELLED | OUTPATIENT
Start: 2024-02-22 | End: 2024-02-22

## 2024-02-22 RX ORDER — SODIUM CHLORIDE 0.9 % (FLUSH) 0.9 %
10 SYRINGE (ML) INJECTION EVERY 12 HOURS SCHEDULED
Status: CANCELLED | OUTPATIENT
Start: 2024-02-22

## 2024-02-22 NOTE — DISCHARGE INSTRUCTIONS
PAT PASS GIVEN/REVIEWED WITH PT.  VERBALIZED UNDERSTANDING OF THE FOLLOWING:  DO NOT EAT, DRINK, SMOKE, USE SMOKELESS TOBACCO OR CHEW GUM AFTER MIDNIGHT THE NIGHT BEFORE SURGERY.  THIS ALSO INCLUDES HARD CANDIES AND MINTS.    DO NOT SHAVE THE AREA TO BE OPERATED ON AT LEAST 48 HOURS PRIOR TO THE PROCEDURE.  DO NOT WEAR MAKE UP OR NAIL POLISH.  DO NOT LEAVE IN ANY PIERCING OR WEAR JEWELRY THE DAY OF SURGERY.      DO NOT USE ADHESIVES IF YOU WEAR DENTURES.    DO NOT WEAR EYE CONTACTS; BRING IN YOUR GLASSES.    ONLY TAKE MEDICATION THE MORNING OF YOUR PROCEDURE IF INSTRUCTED BY YOUR SURGEON WITH ENOUGH WATER TO SWALLOW THE MEDICATION.  IF YOUR SURGEON DID NOT SPECIFY WHICH MEDICATIONS TO TAKE, YOU WILL NEED TO CALL THEIR OFFICE FOR FURTHER INSTRUCTIONS AND DO AS THEY INSTRUCT.    LEAVE ANYTHING YOU CONSIDER VALUABLE AT HOME.    YOU WILL NEED TO ARRANGE FOR SOMEONE TO DRIVE YOU HOME AFTER SURGERY.  IT IS RECOMMENDED THAT YOU DO NOT DRIVE, WORK, DRINK ALCOHOL OR MAKE MAJOR DECISIONS FOR AT LEAST 24 HOURS AFTER YOUR PROCEDURE IS COMPLETE.      THE DAY OF YOUR PROCEDURE, BRING IN THE FOLLOWING IF APPLICABLE:   PICTURE ID AND INSURANCE/MEDICARE OR MEDICAID CARDS/ANY CO-PAY THAT MAY BE DUE   COPY OF ADVANCED DIRECTIVE/LIVING WILL/POWER OR    CPAP/BIPAP/INHALERS   SKIN PREP SHEET   YOUR PREADMISSION TESTING PASS (IF NOT A PHONE HISTORY)    Medication instructions given to pt by RN per anesthesia protocol.  Pt referred back to surgeon for further instructions if he/she is on any blood thinners.          PAT phone history completed with patient for upcoming procedure on .    PAT PASS reviewed with patient and they verbalize understanding of the following:     Do not eat or drink anything after midnight the night before procedure unless otherwise instructed by physician/surgeon's office, this includes no gum, candy, mints, tobacco products or e-cigarettes.  Do not shave the area to be operated on at least 48 hours prior  to procedure.  Do not wear makeup, lotion, hair products, or nail polish.  Do not wear any jewelry and remove all piercings.  Do not wear any adhesive if you wear dentures.  Do not wear contacts; bring in glasses if needed.  Only take medications on the morning of procedure as instructed by PAT nurse per anesthesia guidelines or as instructed by physician's office.  If you are on any blood thinners reach out to the physician/surgeon's office for instructions on when/if they will need to be stopped prior to procedure.  Bring in picture ID and insurance card, advanced directive copies if applicable, CPAP/BIPAP/Inhalers if indicated morning of procedure, leave any other valuables at home.  Ensure you have arranged for someone to drive you home the day of your procedure and someone to care for you at home afterwards. It is recommended that you do not drive, drink alcohol, or make any major legal decisions for at least 24 hours after your procedure is complete.    Instructions given on hospital entrance and registration location.

## 2024-02-23 ENCOUNTER — ANESTHESIA EVENT (OUTPATIENT)
Dept: PERIOP | Facility: HOSPITAL | Age: 44
End: 2024-02-23
Payer: MEDICARE

## 2024-02-23 NOTE — ANESTHESIA PREPROCEDURE EVALUATION
Anesthesia Evaluation     Patient summary reviewed and Nursing notes reviewed   no history of anesthetic complications:   NPO Solid Status: > 8 hours  NPO Liquid Status: > 8 hours           Airway   Mallampati: II  TM distance: >3 FB  Neck ROM: full  Possible difficult intubation  Dental    (+) upper dentures    Pulmonary - normal exam   (+) a smoker Former,  Cardiovascular - normal exam  Exercise tolerance: good (4-7 METS)    Rhythm: regular  Rate: normal    (+) hypertension well controlled less than 2 medications, hyperlipidemia      Neuro/Psych  (+) headaches, psychiatric history Depression and ADHDParkinson's disease: migraines.  GI/Hepatic/Renal/Endo      Musculoskeletal     Abdominal  - normal exam    Abdomen: soft.  Bowel sounds: normal.   Substance History      OB/GYN negative ob/gyn ROS   (-)  Pregnant        Other      history of cancer (gallbladder) remission    ROS/Med Hx Other: 15.6/44.7  K 3.2                Anesthesia Plan    ASA 3     general with block     (Risks and benefits discussed including risk of aspiration, recall and dental damage. All patient questions answered. Will continue with POC.     TAP: Discussed risk of infection, bruising, tenderness at injection site, possible nerve damage and risk of failed block. All patient questions answered. Will continue with POC.  Discussed realistic expectation of postoperative pain management.  Informed consent obtained from patient preoperatively.  Spoke with mother intraoperative due obtain consent )  intravenous induction     Anesthetic plan, risks, benefits, and alternatives have been provided, discussed and informed consent has been obtained with: patient.  Pre-procedure education provided      CODE STATUS:

## 2024-02-26 ENCOUNTER — ANESTHESIA EVENT CONVERTED (OUTPATIENT)
Dept: ANESTHESIOLOGY | Facility: HOSPITAL | Age: 44
End: 2024-02-26
Payer: MEDICARE

## 2024-02-26 ENCOUNTER — ANESTHESIA (OUTPATIENT)
Dept: PERIOP | Facility: HOSPITAL | Age: 44
End: 2024-02-26
Payer: MEDICARE

## 2024-02-26 ENCOUNTER — HOSPITAL ENCOUNTER (INPATIENT)
Facility: HOSPITAL | Age: 44
LOS: 2 days | Discharge: HOME | End: 2024-02-28
Attending: OBSTETRICS & GYNECOLOGY | Admitting: OBSTETRICS & GYNECOLOGY
Payer: MEDICARE

## 2024-02-26 DIAGNOSIS — Z98.890 HISTORY OF ENDOMETRIAL ABLATION: ICD-10-CM

## 2024-02-26 DIAGNOSIS — N93.9 ABNORMAL UTERINE BLEEDING (AUB): ICD-10-CM

## 2024-02-26 DIAGNOSIS — R10.2 PELVIC PAIN: ICD-10-CM

## 2024-02-26 DIAGNOSIS — Z90.711 S/P ABDOMINAL SUPRACERVICAL SUBTOTAL HYSTERECTOMY: Primary | ICD-10-CM

## 2024-02-26 PROBLEM — Z98.51 H/O TUBAL LIGATION: Status: RESOLVED | Noted: 2021-09-21 | Resolved: 2024-02-26

## 2024-02-26 PROBLEM — Z90.79 H/O BILATERAL SALPINGECTOMY: Status: ACTIVE | Noted: 2024-02-26

## 2024-02-26 PROBLEM — N92.1 MENORRHAGIA WITH IRREGULAR CYCLE: Status: RESOLVED | Noted: 2021-11-09 | Resolved: 2024-02-26

## 2024-02-26 LAB
ABO GROUP BLD: NORMAL
BLD GP AB SCN SERPL QL: NEGATIVE
RH BLD: POSITIVE
T&S EXPIRATION DATE: NORMAL

## 2024-02-26 PROCEDURE — 0TJB8ZZ INSPECTION OF BLADDER, VIA NATURAL OR ARTIFICIAL OPENING ENDOSCOPIC: ICD-10-PCS | Performed by: OBSTETRICS & GYNECOLOGY

## 2024-02-26 PROCEDURE — 25010000002 DEXAMETHASONE PER 1 MG: Performed by: NURSE ANESTHETIST, CERTIFIED REGISTERED

## 2024-02-26 PROCEDURE — 0TNB0ZZ RELEASE BLADDER, OPEN APPROACH: ICD-10-PCS | Performed by: OBSTETRICS & GYNECOLOGY

## 2024-02-26 PROCEDURE — 25010000002 FENTANYL CITRATE (PF) 50 MCG/ML SOLUTION PREFILLED SYRINGE: Performed by: NURSE ANESTHETIST, CERTIFIED REGISTERED

## 2024-02-26 PROCEDURE — 25010000002 BUPIVACAINE (PF) 0.5 % SOLUTION: Performed by: NURSE ANESTHETIST, CERTIFIED REGISTERED

## 2024-02-26 PROCEDURE — 0UJD8ZZ INSPECTION OF UTERUS AND CERVIX, VIA NATURAL OR ARTIFICIAL OPENING ENDOSCOPIC: ICD-10-PCS | Performed by: OBSTETRICS & GYNECOLOGY

## 2024-02-26 PROCEDURE — 25010000002 HYDROMORPHONE 1 MG/ML SOLUTION: Performed by: OBSTETRICS & GYNECOLOGY

## 2024-02-26 PROCEDURE — 25010000002 PROPOFOL 200 MG/20ML EMULSION: Performed by: NURSE ANESTHETIST, CERTIFIED REGISTERED

## 2024-02-26 PROCEDURE — 25010000002 HYDROMORPHONE PER 4 MG: Performed by: NURSE ANESTHETIST, CERTIFIED REGISTERED

## 2024-02-26 PROCEDURE — C1782 MORCELLATOR: HCPCS | Performed by: OBSTETRICS & GYNECOLOGY

## 2024-02-26 PROCEDURE — 86900 BLOOD TYPING SEROLOGIC ABO: CPT | Performed by: OBSTETRICS & GYNECOLOGY

## 2024-02-26 PROCEDURE — 0UT70ZZ RESECTION OF BILATERAL FALLOPIAN TUBES, OPEN APPROACH: ICD-10-PCS | Performed by: OBSTETRICS & GYNECOLOGY

## 2024-02-26 PROCEDURE — 25010000002 ONDANSETRON PER 1 MG: Performed by: OBSTETRICS & GYNECOLOGY

## 2024-02-26 PROCEDURE — 86901 BLOOD TYPING SEROLOGIC RH(D): CPT | Performed by: OBSTETRICS & GYNECOLOGY

## 2024-02-26 PROCEDURE — 25010000002 KETOROLAC TROMETHAMINE PER 15 MG: Performed by: NURSE ANESTHETIST, CERTIFIED REGISTERED

## 2024-02-26 PROCEDURE — 25010000002 CEFAZOLIN SODIUM-DEXTROSE 2-3 GM-%(50ML) RECONSTITUTED SOLUTION: Performed by: OBSTETRICS & GYNECOLOGY

## 2024-02-26 PROCEDURE — 25010000002 ONDANSETRON PER 1 MG: Performed by: NURSE ANESTHETIST, CERTIFIED REGISTERED

## 2024-02-26 PROCEDURE — 0U900ZZ DRAINAGE OF RIGHT OVARY, OPEN APPROACH: ICD-10-PCS | Performed by: OBSTETRICS & GYNECOLOGY

## 2024-02-26 PROCEDURE — 58180 PARTIAL HYSTERECTOMY: CPT | Performed by: OBSTETRICS & GYNECOLOGY

## 2024-02-26 PROCEDURE — 25010000002 HYDROMORPHONE 1 MG/ML SOLUTION: Performed by: NURSE ANESTHETIST, CERTIFIED REGISTERED

## 2024-02-26 PROCEDURE — 25810000003 LACTATED RINGERS PER 1000 ML: Performed by: OBSTETRICS & GYNECOLOGY

## 2024-02-26 PROCEDURE — 25010000002 SUGAMMADEX 200 MG/2ML SOLUTION: Performed by: NURSE ANESTHETIST, CERTIFIED REGISTERED

## 2024-02-26 PROCEDURE — 25010000002 MIDAZOLAM PER 1MG: Performed by: NURSE ANESTHETIST, CERTIFIED REGISTERED

## 2024-02-26 PROCEDURE — S0260 H&P FOR SURGERY: HCPCS | Performed by: OBSTETRICS & GYNECOLOGY

## 2024-02-26 PROCEDURE — 25010000002 DEXAMETHASONE SODIUM PHOSPHATE 10 MG/ML SOLUTION: Performed by: NURSE ANESTHETIST, CERTIFIED REGISTERED

## 2024-02-26 PROCEDURE — 25010000002 HALOPERIDOL LACTATE PER 5 MG: Performed by: NURSE ANESTHETIST, CERTIFIED REGISTERED

## 2024-02-26 PROCEDURE — 86850 RBC ANTIBODY SCREEN: CPT | Performed by: OBSTETRICS & GYNECOLOGY

## 2024-02-26 PROCEDURE — 0UT90ZL RESECTION OF UTERUS, SUPRACERVICAL, OPEN APPROACH: ICD-10-PCS | Performed by: OBSTETRICS & GYNECOLOGY

## 2024-02-26 PROCEDURE — 25810000003 SODIUM CHLORIDE 0.9 % SOLUTION: Performed by: OBSTETRICS & GYNECOLOGY

## 2024-02-26 DEVICE — HEMOST ABS SURGICEL ORIG 4X8IN STRL: Type: IMPLANTABLE DEVICE | Site: ABDOMEN | Status: FUNCTIONAL

## 2024-02-26 RX ORDER — SIMETHICONE 80 MG
80 TABLET,CHEWABLE ORAL 4 TIMES DAILY PRN
Status: DISCONTINUED | OUTPATIENT
Start: 2024-02-26 | End: 2024-02-28 | Stop reason: HOSPADM

## 2024-02-26 RX ORDER — DEXAMETHASONE SODIUM PHOSPHATE 10 MG/ML
INJECTION, SOLUTION INTRAMUSCULAR; INTRAVENOUS AS NEEDED
Status: DISCONTINUED | OUTPATIENT
Start: 2024-02-26 | End: 2024-02-26

## 2024-02-26 RX ORDER — PHENYLEPHRINE HCL IN 0.9% NACL 1 MG/10 ML
SYRINGE (ML) INTRAVENOUS AS NEEDED
Status: DISCONTINUED | OUTPATIENT
Start: 2024-02-26 | End: 2024-02-26 | Stop reason: SURG

## 2024-02-26 RX ORDER — CALCIUM CARBONATE 500 MG/1
1 TABLET, CHEWABLE ORAL 3 TIMES DAILY PRN
Status: DISCONTINUED | OUTPATIENT
Start: 2024-02-26 | End: 2024-02-28 | Stop reason: HOSPADM

## 2024-02-26 RX ORDER — ONDANSETRON 2 MG/ML
4 INJECTION INTRAMUSCULAR; INTRAVENOUS ONCE AS NEEDED
Status: DISCONTINUED | OUTPATIENT
Start: 2024-02-26 | End: 2024-02-26 | Stop reason: HOSPADM

## 2024-02-26 RX ORDER — PROCHLORPERAZINE EDISYLATE 5 MG/ML
10 INJECTION INTRAMUSCULAR; INTRAVENOUS ONCE AS NEEDED
Status: DISCONTINUED | OUTPATIENT
Start: 2024-02-26 | End: 2024-02-26 | Stop reason: HOSPADM

## 2024-02-26 RX ORDER — PROPOFOL 10 MG/ML
INJECTION, EMULSION INTRAVENOUS AS NEEDED
Status: DISCONTINUED | OUTPATIENT
Start: 2024-02-26 | End: 2024-02-26 | Stop reason: SURG

## 2024-02-26 RX ORDER — DEXTROSE AND SODIUM CHLORIDE 5; .45 G/100ML; G/100ML
75 INJECTION, SOLUTION INTRAVENOUS CONTINUOUS
Status: DISCONTINUED | OUTPATIENT
Start: 2024-02-26 | End: 2024-02-28 | Stop reason: HOSPADM

## 2024-02-26 RX ORDER — ONDANSETRON 2 MG/ML
INJECTION INTRAMUSCULAR; INTRAVENOUS AS NEEDED
Status: DISCONTINUED | OUTPATIENT
Start: 2024-02-26 | End: 2024-02-26 | Stop reason: SURG

## 2024-02-26 RX ORDER — MIDAZOLAM HYDROCHLORIDE 2 MG/2ML
INJECTION, SOLUTION INTRAMUSCULAR; INTRAVENOUS AS NEEDED
Status: DISCONTINUED | OUTPATIENT
Start: 2024-02-26 | End: 2024-02-26 | Stop reason: SURG

## 2024-02-26 RX ORDER — ROCURONIUM BROMIDE 50 MG/5 ML
SYRINGE (ML) INTRAVENOUS AS NEEDED
Status: DISCONTINUED | OUTPATIENT
Start: 2024-02-26 | End: 2024-02-26 | Stop reason: SURG

## 2024-02-26 RX ORDER — SODIUM CHLORIDE 0.9 % (FLUSH) 0.9 %
10 SYRINGE (ML) INJECTION EVERY 12 HOURS SCHEDULED
Status: DISCONTINUED | OUTPATIENT
Start: 2024-02-26 | End: 2024-02-26 | Stop reason: HOSPADM

## 2024-02-26 RX ORDER — ALUMINA, MAGNESIA, AND SIMETHICONE 2400; 2400; 240 MG/30ML; MG/30ML; MG/30ML
15 SUSPENSION ORAL EVERY 6 HOURS
Status: DISCONTINUED | OUTPATIENT
Start: 2024-02-26 | End: 2024-02-28 | Stop reason: HOSPADM

## 2024-02-26 RX ORDER — BUPROPION HYDROCHLORIDE 150 MG/1
300 TABLET ORAL EVERY MORNING
Status: DISCONTINUED | OUTPATIENT
Start: 2024-02-27 | End: 2024-02-28 | Stop reason: HOSPADM

## 2024-02-26 RX ORDER — FAMOTIDINE 20 MG/1
20 TABLET, FILM COATED ORAL 2 TIMES DAILY
Status: DISCONTINUED | OUTPATIENT
Start: 2024-02-26 | End: 2024-02-28 | Stop reason: HOSPADM

## 2024-02-26 RX ORDER — ONDANSETRON 4 MG/1
4 TABLET, ORALLY DISINTEGRATING ORAL EVERY 6 HOURS PRN
Status: DISCONTINUED | OUTPATIENT
Start: 2024-02-26 | End: 2024-02-28 | Stop reason: HOSPADM

## 2024-02-26 RX ORDER — IBUPROFEN 800 MG/1
800 TABLET ORAL EVERY 8 HOURS
Status: DISCONTINUED | OUTPATIENT
Start: 2024-02-26 | End: 2024-02-28 | Stop reason: HOSPADM

## 2024-02-26 RX ORDER — DEXAMETHASONE SODIUM PHOSPHATE 4 MG/ML
INJECTION, SOLUTION INTRA-ARTICULAR; INTRALESIONAL; INTRAMUSCULAR; INTRAVENOUS; SOFT TISSUE AS NEEDED
Status: DISCONTINUED | OUTPATIENT
Start: 2024-02-26 | End: 2024-02-26 | Stop reason: SURG

## 2024-02-26 RX ORDER — SODIUM CHLORIDE, SODIUM LACTATE, POTASSIUM CHLORIDE, CALCIUM CHLORIDE 600; 310; 30; 20 MG/100ML; MG/100ML; MG/100ML; MG/100ML
1000 INJECTION, SOLUTION INTRAVENOUS CONTINUOUS
Status: DISCONTINUED | OUTPATIENT
Start: 2024-02-26 | End: 2024-02-26

## 2024-02-26 RX ORDER — HYDROMORPHONE HYDROCHLORIDE 2 MG/ML
INJECTION, SOLUTION INTRAMUSCULAR; INTRAVENOUS; SUBCUTANEOUS AS NEEDED
Status: DISCONTINUED | OUTPATIENT
Start: 2024-02-26 | End: 2024-02-26 | Stop reason: SURG

## 2024-02-26 RX ORDER — SODIUM CHLORIDE 0.9 % (FLUSH) 0.9 %
10 SYRINGE (ML) INJECTION AS NEEDED
Status: DISCONTINUED | OUTPATIENT
Start: 2024-02-26 | End: 2024-02-26 | Stop reason: HOSPADM

## 2024-02-26 RX ORDER — OXYCODONE HYDROCHLORIDE 5 MG/1
10 TABLET ORAL EVERY 4 HOURS PRN
Status: DISCONTINUED | OUTPATIENT
Start: 2024-02-26 | End: 2024-02-28 | Stop reason: HOSPADM

## 2024-02-26 RX ORDER — SODIUM CHLORIDE 9 MG/ML
INJECTION, SOLUTION INTRAVENOUS CONTINUOUS PRN
Status: COMPLETED | OUTPATIENT
Start: 2024-02-26 | End: 2024-02-26

## 2024-02-26 RX ORDER — CLONAZEPAM 0.5 MG/1
0.5 TABLET ORAL 2 TIMES DAILY PRN
Status: DISCONTINUED | OUTPATIENT
Start: 2024-02-26 | End: 2024-02-28 | Stop reason: HOSPADM

## 2024-02-26 RX ORDER — SODIUM CHLORIDE 9 MG/ML
40 INJECTION, SOLUTION INTRAVENOUS AS NEEDED
Status: DISCONTINUED | OUTPATIENT
Start: 2024-02-26 | End: 2024-02-26 | Stop reason: HOSPADM

## 2024-02-26 RX ORDER — ONDANSETRON 2 MG/ML
4 INJECTION INTRAMUSCULAR; INTRAVENOUS EVERY 6 HOURS PRN
Status: DISCONTINUED | OUTPATIENT
Start: 2024-02-26 | End: 2024-02-28 | Stop reason: HOSPADM

## 2024-02-26 RX ORDER — ACETAMINOPHEN 500 MG
1000 TABLET ORAL EVERY 8 HOURS
Status: DISCONTINUED | OUTPATIENT
Start: 2024-02-26 | End: 2024-02-28 | Stop reason: HOSPADM

## 2024-02-26 RX ORDER — HALOPERIDOL 5 MG/ML
INJECTION INTRAMUSCULAR AS NEEDED
Status: DISCONTINUED | OUTPATIENT
Start: 2024-02-26 | End: 2024-02-26 | Stop reason: SURG

## 2024-02-26 RX ORDER — BUPIVACAINE HYDROCHLORIDE 5 MG/ML
INJECTION, SOLUTION EPIDURAL; INTRACAUDAL
Status: COMPLETED | OUTPATIENT
Start: 2024-02-26 | End: 2024-02-26

## 2024-02-26 RX ORDER — CEFAZOLIN SODIUM 2 G/50ML
2000 SOLUTION INTRAVENOUS ONCE
Status: COMPLETED | OUTPATIENT
Start: 2024-02-26 | End: 2024-02-26

## 2024-02-26 RX ORDER — DOCUSATE SODIUM 100 MG/1
100 CAPSULE, LIQUID FILLED ORAL 2 TIMES DAILY
Status: DISCONTINUED | OUTPATIENT
Start: 2024-02-26 | End: 2024-02-28 | Stop reason: HOSPADM

## 2024-02-26 RX ORDER — PROMETHAZINE HYDROCHLORIDE 12.5 MG/1
12.5 SUPPOSITORY RECTAL EVERY 6 HOURS PRN
Status: DISCONTINUED | OUTPATIENT
Start: 2024-02-26 | End: 2024-02-28 | Stop reason: HOSPADM

## 2024-02-26 RX ORDER — KETOROLAC TROMETHAMINE 30 MG/ML
INJECTION, SOLUTION INTRAMUSCULAR; INTRAVENOUS AS NEEDED
Status: DISCONTINUED | OUTPATIENT
Start: 2024-02-26 | End: 2024-02-26 | Stop reason: SURG

## 2024-02-26 RX ORDER — LIDOCAINE HYDROCHLORIDE 20 MG/ML
INJECTION, SOLUTION INTRAVENOUS AS NEEDED
Status: DISCONTINUED | OUTPATIENT
Start: 2024-02-26 | End: 2024-02-26 | Stop reason: SURG

## 2024-02-26 RX ORDER — NALOXONE HCL 0.4 MG/ML
0.1 VIAL (ML) INJECTION
Status: DISCONTINUED | OUTPATIENT
Start: 2024-02-26 | End: 2024-02-28 | Stop reason: HOSPADM

## 2024-02-26 RX ORDER — PROMETHAZINE HYDROCHLORIDE 12.5 MG/1
12.5 TABLET ORAL EVERY 6 HOURS PRN
Status: DISCONTINUED | OUTPATIENT
Start: 2024-02-26 | End: 2024-02-28 | Stop reason: HOSPADM

## 2024-02-26 RX ORDER — HYDROXYCHLOROQUINE SULFATE 200 MG/1
200 TABLET, FILM COATED ORAL DAILY
Status: DISCONTINUED | OUTPATIENT
Start: 2024-02-27 | End: 2024-02-28 | Stop reason: HOSPADM

## 2024-02-26 RX ORDER — MEPERIDINE HYDROCHLORIDE 25 MG/ML
12.5 INJECTION INTRAMUSCULAR; INTRAVENOUS; SUBCUTANEOUS
Status: DISCONTINUED | OUTPATIENT
Start: 2024-02-26 | End: 2024-02-26 | Stop reason: HOSPADM

## 2024-02-26 RX ORDER — DEXAMETHASONE SODIUM PHOSPHATE 10 MG/ML
INJECTION, SOLUTION INTRAMUSCULAR; INTRAVENOUS AS NEEDED
Status: DISCONTINUED | OUTPATIENT
Start: 2024-02-26 | End: 2024-02-26 | Stop reason: SURG

## 2024-02-26 RX ORDER — OXYCODONE HYDROCHLORIDE 5 MG/1
5 TABLET ORAL EVERY 4 HOURS PRN
Status: DISCONTINUED | OUTPATIENT
Start: 2024-02-26 | End: 2024-02-28 | Stop reason: HOSPADM

## 2024-02-26 RX ORDER — PHENAZOPYRIDINE HYDROCHLORIDE 100 MG/1
200 TABLET, FILM COATED ORAL ONCE
Status: COMPLETED | OUTPATIENT
Start: 2024-02-26 | End: 2024-02-26

## 2024-02-26 RX ORDER — IPRATROPIUM BROMIDE AND ALBUTEROL SULFATE 2.5; .5 MG/3ML; MG/3ML
3 SOLUTION RESPIRATORY (INHALATION) ONCE AS NEEDED
Status: DISCONTINUED | OUTPATIENT
Start: 2024-02-26 | End: 2024-02-26 | Stop reason: HOSPADM

## 2024-02-26 RX ORDER — FENTANYL CITRATE 50 UG/ML
INJECTION, SOLUTION INTRAMUSCULAR; INTRAVENOUS AS NEEDED
Status: DISCONTINUED | OUTPATIENT
Start: 2024-02-26 | End: 2024-02-26 | Stop reason: SURG

## 2024-02-26 RX ADMIN — Medication 20 MG: at 09:52

## 2024-02-26 RX ADMIN — DOCUSATE SODIUM 100 MG: 100 CAPSULE, LIQUID FILLED ORAL at 20:09

## 2024-02-26 RX ADMIN — DEXAMETHASONE SODIUM PHOSPHATE 5 MG: 10 INJECTION, SOLUTION INTRAMUSCULAR; INTRAVENOUS at 11:40

## 2024-02-26 RX ADMIN — ALUMINUM HYDROXIDE, MAGNESIUM HYDROXIDE, AND DIMETHICONE 15 ML: 400; 400; 40 SUSPENSION ORAL at 14:48

## 2024-02-26 RX ADMIN — HYDROMORPHONE HYDROCHLORIDE 0.5 MG: 2 INJECTION, SOLUTION INTRAMUSCULAR; INTRAVENOUS; SUBCUTANEOUS at 09:37

## 2024-02-26 RX ADMIN — CEFAZOLIN SODIUM 2 G: 2 SOLUTION INTRAVENOUS at 07:36

## 2024-02-26 RX ADMIN — HYDROMORPHONE HYDROCHLORIDE 0.5 MG: 1 INJECTION, SOLUTION INTRAMUSCULAR; INTRAVENOUS; SUBCUTANEOUS at 23:56

## 2024-02-26 RX ADMIN — MIDAZOLAM HYDROCHLORIDE 2 MG: 1 INJECTION, SOLUTION INTRAMUSCULAR; INTRAVENOUS at 07:31

## 2024-02-26 RX ADMIN — ONDANSETRON 4 MG: 2 INJECTION INTRAMUSCULAR; INTRAVENOUS at 07:49

## 2024-02-26 RX ADMIN — Medication 10 MG: at 08:52

## 2024-02-26 RX ADMIN — Medication 50 MG: at 07:32

## 2024-02-26 RX ADMIN — SODIUM CHLORIDE, POTASSIUM CHLORIDE, SODIUM LACTATE AND CALCIUM CHLORIDE 1000 ML: 600; 310; 30; 20 INJECTION, SOLUTION INTRAVENOUS at 06:54

## 2024-02-26 RX ADMIN — SODIUM CHLORIDE, POTASSIUM CHLORIDE, SODIUM LACTATE AND CALCIUM CHLORIDE: 600; 310; 30; 20 INJECTION, SOLUTION INTRAVENOUS at 10:14

## 2024-02-26 RX ADMIN — ACETAMINOPHEN 1000 MG: 500 TABLET ORAL at 18:28

## 2024-02-26 RX ADMIN — Medication 20 MG: at 09:36

## 2024-02-26 RX ADMIN — DEXAMETHASONE SODIUM PHOSPHATE 5 MG: 10 INJECTION, SOLUTION INTRAMUSCULAR; INTRAVENOUS at 11:45

## 2024-02-26 RX ADMIN — BUPIVACAINE HYDROCHLORIDE 15 ML: 5 INJECTION, SOLUTION EPIDURAL; INTRACAUDAL; PERINEURAL at 11:45

## 2024-02-26 RX ADMIN — FAMOTIDINE 20 MG: 20 TABLET, FILM COATED ORAL at 20:09

## 2024-02-26 RX ADMIN — SUGAMMADEX 200 MG: 100 INJECTION, SOLUTION INTRAVENOUS at 11:42

## 2024-02-26 RX ADMIN — SODIUM CHLORIDE, POTASSIUM CHLORIDE, SODIUM LACTATE AND CALCIUM CHLORIDE: 600; 310; 30; 20 INJECTION, SOLUTION INTRAVENOUS at 08:05

## 2024-02-26 RX ADMIN — HYDROMORPHONE HYDROCHLORIDE 0.5 MG: 1 INJECTION, SOLUTION INTRAMUSCULAR; INTRAVENOUS; SUBCUTANEOUS at 12:27

## 2024-02-26 RX ADMIN — Medication 100 MCG: at 10:21

## 2024-02-26 RX ADMIN — HYDROMORPHONE HYDROCHLORIDE 0.5 MG: 1 INJECTION, SOLUTION INTRAMUSCULAR; INTRAVENOUS; SUBCUTANEOUS at 17:35

## 2024-02-26 RX ADMIN — PROPOFOL 200 MG: 10 INJECTION, EMULSION INTRAVENOUS at 07:32

## 2024-02-26 RX ADMIN — OXYCODONE HYDROCHLORIDE 10 MG: 5 TABLET ORAL at 20:09

## 2024-02-26 RX ADMIN — IBUPROFEN 800 MG: 800 TABLET, FILM COATED ORAL at 14:47

## 2024-02-26 RX ADMIN — FENTANYL CITRATE 50 MCG: 50 INJECTION, SOLUTION INTRAMUSCULAR; INTRAVENOUS at 07:36

## 2024-02-26 RX ADMIN — KETOROLAC TROMETHAMINE 15 MG: 30 INJECTION, SOLUTION INTRAMUSCULAR; INTRAVENOUS at 11:07

## 2024-02-26 RX ADMIN — Medication 200 MCG: at 09:50

## 2024-02-26 RX ADMIN — ONDANSETRON 4 MG: 2 INJECTION INTRAMUSCULAR; INTRAVENOUS at 18:28

## 2024-02-26 RX ADMIN — IBUPROFEN 800 MG: 800 TABLET, FILM COATED ORAL at 23:56

## 2024-02-26 RX ADMIN — OXYCODONE HYDROCHLORIDE 10 MG: 5 TABLET ORAL at 14:47

## 2024-02-26 RX ADMIN — PHENAZOPYRIDINE HYDROCHLORIDE 200 MG: 100 TABLET ORAL at 06:45

## 2024-02-26 RX ADMIN — BUPIVACAINE HYDROCHLORIDE 15 ML: 5 INJECTION, SOLUTION EPIDURAL; INTRACAUDAL; PERINEURAL at 11:40

## 2024-02-26 RX ADMIN — DEXAMETHASONE SODIUM PHOSPHATE 4 MG: 4 INJECTION, SOLUTION INTRA-ARTICULAR; INTRALESIONAL; INTRAMUSCULAR; INTRAVENOUS; SOFT TISSUE at 07:49

## 2024-02-26 RX ADMIN — HYDROMORPHONE HYDROCHLORIDE 0.5 MG: 1 INJECTION, SOLUTION INTRAMUSCULAR; INTRAVENOUS; SUBCUTANEOUS at 21:31

## 2024-02-26 RX ADMIN — HALOPERIDOL LACTATE 0.5 MG: 5 INJECTION, SOLUTION INTRAMUSCULAR at 09:08

## 2024-02-26 RX ADMIN — HYDROMORPHONE HYDROCHLORIDE 0.5 MG: 1 INJECTION, SOLUTION INTRAMUSCULAR; INTRAVENOUS; SUBCUTANEOUS at 15:27

## 2024-02-26 RX ADMIN — ONDANSETRON 4 MG: 2 INJECTION INTRAMUSCULAR; INTRAVENOUS at 23:56

## 2024-02-26 RX ADMIN — LIDOCAINE HYDROCHLORIDE 50 MG: 20 INJECTION, SOLUTION INTRAVENOUS at 07:32

## 2024-02-26 RX ADMIN — DEXTROSE AND SODIUM CHLORIDE 75 ML/HR: 5; 450 INJECTION, SOLUTION INTRAVENOUS at 14:47

## 2024-02-26 RX ADMIN — ALUMINUM HYDROXIDE, MAGNESIUM HYDROXIDE, AND DIMETHICONE 15 ML: 400; 400; 40 SUSPENSION ORAL at 20:09

## 2024-02-26 RX ADMIN — Medication 200 MCG: at 09:42

## 2024-02-26 NOTE — OP NOTE
Kevin Modi  : 1980  MRN: [id number]  CSN: [id number]  Date: 2024    Operative Report    Pre-op Diagnosis:  Abnormal uterine bleeding  Menorrhagia with irregular cycle  Pelvic pain  Previous endometrial ablation and tubal ligation  Uterine fibroids  Enlarged uterus   Post-op Diagnosis:  Same  Significant pelvic adhesive disease   Procedure: Diagnostic hysteroscopy   Diagnostic laparoscopy  Conversion to an open supracervical hysterectomy  Significant lysis of adhesions  Drainage of right ovarian cysts (2)  Bilateral salpingectomy  Cystoscopy    Surgeon:  Surgical assistant: MILENA Patel was responsible for performing the following activities: Retraction, Suction, Placing Dressing, and manipulation of hysteroscopic and laparoscopic instruments  and their skilled assistance was necessary for the success of this case.     OR Staff: Circulator: Mallory Clemente RN; Kaelyn Murray RN  Scrub Person: Danii Hubre PCT; Nanette Arredondo; Alisa Nolan     Anesthesia: General with block   Estimated Blood Loss: 800 mls   ABx: cefazolin 2 gms   Specimens:  Uterus + fibroids and bilateral fallopian tubes        Complications: None         Findings:   Hysteroscopy revealed significant scarring within the uterus and a distorted endometrial cavity secondary to scarring and fibroids.  Neither tubal ostia was visualized.  Laparoscopy revealed an enlarged fibroid uterus and dense adhesive disease between the bladder and anterior surface of the uterus/cervix.  The right ovary contained two small simple cysts, but otherwise the ovaries were normal in appearance.  Evidence of previous tubal ligation. No evidence of endometriosis.  The bladder was normal in appearance with no signs of trauma or suture and bilateral ureteral efflux was noted on cystoscopy.    Description of Procedure:   After the appropriate time out and adequate dosing of her anesthesia, the patient  was prepped and draped in the usual sterile fashion.  The patient was placed in the dorsal lithotomy position using Edgar stirrups.  A ham catheter had been placed in the bladder for drainage during the procedure.  The patient desired an attempt at hysteroscopic procedure, so this was undertaken first.  A heavy-weighted speculum and Echeverria retractor were placed in the vagina.  The anterior lip of the cervix was grasped with a single tooth tenaculum.  The uterus was sounded with some difficulty because of the previous ablation, but 11 cm of length was ultimately accessible. The cervix was sequentially dilated. I placed a diagnostic hysteroscope to assess the endometrial cavity.  Findings are noted above.  A repeat attempt at ablation was not feasible/prudent, so I opted to proceed with definitive management via hysterectomy. Measurements were taken and an appropriate sized MAIN manipulator was placed in the usual fashion with stay sutures at 3 and 9 O'Clock on the cervix. The manipulator was then attached to the [place]berry uterine positioning system and covered over with a sterile towel.     Attention was then turned to the abdomen.  An infraumbilical skin incision was made with the scalpel and a 5 mm trocar was inserted under direct visualization without any complications.  The abdomen was insufflated with CO2 being sure to keep the pressure less than 15 mmHg. The patient was placed in Trendelenburg position. Bilateral ancillary ports were placed with a 5 mm tocar in the left lower quadrant and a 11 mm port in the right lower quadrant with the aid of transillumination and after identification of the inferior epigastric vessels. The ports were placed under direct visualization without any complications and all entry sites were inspected and found to be atraumatic. The abdomen and pelvis were then explored with the above findings noted.  The ureters were identified bilaterally and noted to be out of the operative fields at  all times during the case.      The left fallopian tube was elevated with an atraumatic grasper and pulled medially.  The Harmonic scalpel was then used to separate the fallopian tube from the underlying mesosalpinx.  The fallopian tube was fully excised and removed from the patient. The uterine-ovarian vessels were then transected using the Harmonic device.  The round ligament was then transected with the Harmonic and the anterior and posterior leaves of the broad ligament were developed.  Dissection was then carried anteriorly and inferiorly along the anterior leaf of the broad ligament to initiate a bladder flap.  Dense adhesive disease was encountered here and careful dissection was required.  The bladder was backfilled with normal saline to clearly delineate its borders.  I was not able to clearly identify the MAIN cup.  I was not able to safely address the uterine vasculature on this side.  A similar process was repeated on the patient's right side, but here too I was unable to clearly identify the MAIN cup.  The bladder was densely adherent to the anterior surface of the uterus/cervix.  Additionally, one of the fibroids distorted anatomy along the right side and some bleeding was encountered.  I decided to convert to an open procedure to safely complete the dissection.     A pfannenstiel incision was made in the lower abdomen with the scalpel and carried down to the fascia with bovie. The fascia was incised in the midline and extended bilaterally with the Evans scissors. The fascia was then dissected off the underlying rectus muscles with a combination of sharp and blunt dissection. The rectus muscles were  in the midline and the underlying peritoneum was entered sharply.  A significant amount of blood and clot was then cleared from the abdomen and pelvis.  The peritoneal incision was then extended with blunt traction. An Henri retractor was inserted atraumatically. The bowel was packed away with wet  lap sponges. The uterus was elevated out of the pelvic cavity and bleeding was carefully assessed. I advanced the bladder flap and then addressed the uterine vasculature on both sides.  Straight Lenard clamps were applied and the pedicles were tied off with 0 Vicryl sutures.  Given the dense bladder adhesions and the distance to the MAIN cup, I opted to perform a supracervical hysterectomy.  The uterus was then disconnected from the cervix with heavy scissors.  The surgical specimens were labeled and sent to pathology for review.  I then oversewed the cervical stump in 2 layers with 0 Vicryl suture.  Adequate hemostasis was noted.  The two small right ovarian cysts were incised with the Bovie and the simple straw-colored fluid was drained.     The pelvis was irrigated with normal saline and cleared of all clots and debris. Surgicel was applied along all surgical planes. The packing sponges and Henri retractor were removed atraumatically. The fascia was elevated and subfascial tissues were made sufficiently hemostatic with the bovie.  The peritoneum was closed with 3-0 chromic suture. The fascia was closed with a 0 PDS in a running fashion. The subcutaneous space was irrigated, cleared of all clots and debris, and small bleeding vessels were fulgurated with the bovie.  The subcutaneous tissues were closed with 3-0 chromic suture. The skin was closed with Insorb staples. The 3 laparoscopic incisions were made hemostatic with the Bovie and then closed with 3-0 Monocryl suture in a subcuticular fashion.  Standard dressings were applied.      Attention was then turned to the vagina where the uterine manipulator was fully removed and the cervix was assessed.  Adequate hemostasis was noted.  The Doll catheter was removed. A diagnostic cystoscope was inserted through the urethra and the bladder was inspected.  No trauma, injury or suture was noted. Ureteral efflux was noted from both ureters. A fresh Doll catheter was  re-anchored.  All instruments were removed from the patient.  The patient tolerated the procedure well and there were no complications. All surgical counts were correct at the end of the procedure. She was awakened, extubated and taken to the PACU in stable condition.    Edgar Chavez MD  Obstetrics and Gynecology  [facility]

## 2024-02-26 NOTE — ANESTHESIA PROCEDURE NOTES
Airway  Urgency: elective    Date/Time: 2/26/2024 7:33 AM    General Information and Staff    Patient location during procedure: OR  CRNA/CAA: Jaylyn Ferraro CRNA    Indications and Patient Condition  Indications for airway management: airway protection    Preoxygenated: yes  Mask difficulty assessment: 1 - vent by mask    Final Airway Details  Final airway type: endotracheal airway      Successful airway: ETT  Cuffed: yes   Successful intubation technique: direct laryngoscopy  Facilitating devices/methods: intubating stylet  Endotracheal tube insertion site: oral  Blade: Jorge  Blade size: 3  ETT size (mm): 7.0  Cormack-Lehane Classification: grade I - full view of glottis  Placement verified by: chest auscultation and capnometry   Cuff volume (mL): 5  Measured from: lips  Number of attempts at approach: 1  Assessment: lips, teeth, and gum same as pre-op and atraumatic intubation    Additional Comments  +ETCO2, BBS,

## 2024-02-26 NOTE — H&P
GYNECOLOGY HISTORY AND PHYSICAL    CHIEF COMPLAINT: Scheduled surgery    DIAGNOSIS:  Abnormal uterine bleeding  Menorrhagia with irregular cycle  Pelvic pain and bloating  Previous endometrial ablation and tubal ligation  Uterine fibroids    ASSESSMENT/PLAN     43 y.o.  female who presents for scheduled HYSTEROSCOPY WITH MYOSURE, DILATION AND CURETTAGE, ENDOMETRIAL ABLATION WITH CERENE, POSSIBLE CONVERSION TO A TOTAL LAPAROSCOPIC HYSTERECTOMY WITH BILATERAL SALPINGECTOMY AND CYSTOSCOPY AND ALL OTHER INDICATED PROCEDURES .    - Proceed to the OR for scheduled surgery  - Risks for the procedure reviewed  - SCDs for DVT ppx  - Antibiotics: Ancef 2g    HISTORY OF PRESENT ILLNESS     43 y.o.  female who presents for the scheduled procedure listed above.    She has no complaints today and there are no interval changes to the history.    REVIEW OF SYSTEMS: A complete review of systems was performed and was specifically negative for headache, changes in vision, RUQ pain, shortness of breath, chest pain, lower extremity edema and dysuria.     HISTORY:  Obstetrical History:  OB History    Para Term  AB Living   4 4       5   SAB IAB Ectopic Molar Multiple Live Births                    # Outcome Date GA Lbr Ujlio C/2nd Weight Sex Delivery Anes PTL Lv   4 Para            3 Para            2 Para            1 Para                Past Medical History:  Past Medical History:   Diagnosis Date    Abnormal Pap smear of cervix     ACL injury tear     left knee    ADHD (attention deficit hyperactivity disorder)     Cervical dysplasia     Depression     Fluid retention     left leg and knee    Gallbladder cancer     found incidentally at the time of cholecystectomy.  Treated with chemo x 3 months prior to undergoing definitive liver resection with hepaticojejunoscopy and lymphadenectomy. treated at .     History of fracture     AS A CHILD RIGHT WRIST, RIGHT FOOT    History of transfusion     HPV (human papilloma  virus) infection     Hx antineoplastic chemo     Hyperlipidemia     Migraines     Multiple gestation     Panic disorder     Tattoo     Torn meniscus     left       Past Surgical History:  Past Surgical History:   Procedure Laterality Date     SECTION      X1    CHOLECYSTECTOMY WITH COMMON BILE DUCT EXPLORATION  10/2014    COLONOSCOPY      COLONOSCOPY N/A 2018    Procedure: COLONOSCOPY;  Surgeon: Liz Carcamo MD;  Location: [facility] ENDOSCOPY;  Service: Gastroenterology    COLONOSCOPY N/A 10/16/2023    Procedure: COLONOSCOPY;  Surgeon: Liz Carcamo MD;  Location: [facility] ENDOSCOPY;  Service: Gastroenterology;  Laterality: N/A;    D & C HYSTEROSCOPY ENDOMETRIAL ABLATION      ENDOSCOPY      ERCP      WITH STENT PLACEMENT FOR GALL STONE, PRIOR TO LAP CHOLEY AND DIAGNOSIS OF GB CANCER    LIVER RESECTION      for GB cancer, has hepaticojejunostomy    PORTACATH PLACEMENT      TUBAL ABDOMINAL LIGATION      VENOUS ACCESS DEVICE (PORT) REMOVAL      WISDOM TOOTH EXTRACTION         Social History:  Social History     Tobacco Use    Smoking status: Former     Packs/day: 0.50     Years: 10.00     Additional pack years: 0.00     Total pack years: 5.00     Types: Cigarettes     Quit date:      Years since quittin.1    Smokeless tobacco: Never    Tobacco comments:     TRANSITIONED TO ECIG APPROXIMATELY 10 YEARS AGO.   Vaping Use    Vaping Use: Every day    Substances: Nicotine   Substance Use Topics    Alcohol use: No    Drug use: No       Family History  Family History   Problem Relation Age of Onset    Breast cancer Sister         DCIS    Heart failure Mother          at 74    Diabetes Mother     Hypertension Mother     Heart failure Father         treated fro SCC anal canal.  at 80.     Prostate cancer Father     Diabetes Father         Allergies:   Allergies   Allergen Reactions    Ciprofloxacin Rash    Penicillins Rash       OBJECTIVE   VITALS:  Temp:  [97 °F (36.1 °C)] 97 °F  (36.1 °C)  Heart Rate:  [79] 79  Resp:  [16] 16  BP: (137)/(106) 137/106    PHYSICAL EXAM:  GENERAL: NAD, alert  CHEST: No increased work of breathing, CTAB  CV: RRR, WWP  ABDOMEN: Soft, NTTP  EXTREMITIES:  Warm and well-perfused, nontender, nonedematous  NEURO: AAO x 3, CN II-XII grossly intact    No current facility-administered medications on file prior to encounter.     Current Outpatient Medications on File Prior to Encounter   Medication Sig Dispense Refill    amphetamine-dextroamphetamine (ADDERALL) 30 MG tablet Take 1 tablet by mouth Daily.      buPROPion XL (WELLBUTRIN XL) 300 MG 24 hr tablet Take 1 tablet by mouth Every Morning.  0    clonazePAM (KlonoPIN) 0.5 MG tablet Take 1 tablet by mouth 2 (Two) Times a Day As Needed for Anxiety.      hydroCHLOROthiazide 25 MG tablet Take 1 tablet by mouth Daily.      hydroxychloroquine (PLAQUENIL) 200 MG tablet Take 1 tablet by mouth Daily.      leflunomide (ARAVA) 10 MG tablet Take 1 tablet by mouth Daily.      sertraline (ZOLOFT) 50 MG tablet Take 1 tablet by mouth Daily.      ubrogepant tablet TAKE ONE TABLET AT ONSET OF HEADACHE. MAY REPEAT IN 2 HOURS IF NEEDED. MAX DOSE IN 24 HOURS         DIAGNOSTIC STUDIES:  Results from last 7 days   Lab Units 02/22/24  1113   WBC 10*3/mm3 12.10*   HEMOGLOBIN g/dL 15.6   HEMATOCRIT % 44.7   PLATELETS 10*3/mm3 262   SODIUM mmol/L 135*   POTASSIUM mmol/L 3.2*   CHLORIDE mmol/L 95*   CO2 mmol/L 32.8*   BUN mg/dL 9   CREATININE mg/dL 0.79   GLUCOSE mg/dL 107*   CALCIUM mg/dL 9.3       Recent Results (from the past 24 hour(s))   Type & Screen    Collection Time: 02/26/24  6:39 AM    Specimen: Blood   Result Value Ref Range    ABO Type O     RH type Positive        Edgar Chavez MD  Obstetrics and Gynecology  [facility]

## 2024-02-26 NOTE — ANESTHESIA PROCEDURE NOTES
Peripheral Block      Patient reassessed immediately prior to procedure    Start time: 2/26/2024 11:40 AM  Stop time: 2/26/2024 11:45 AM  Reason for block: at surgeon's request and post-op pain management  Performed by  CRNA/CAA: Jaylyn Ferraro CRNA  Preanesthetic Checklist  Completed: patient identified, IV checked, site marked, risks and benefits discussed, surgical consent, monitors and equipment checked, pre-op evaluation and timeout performed  Prep:  Pt Position: supine  Sterile barriers:gloves, cap, sterile barriers and mask  Prep: ChloraPrep  Patient monitoring: blood pressure monitoring, continuous pulse oximetry and EKG  Procedure  Performed under: general  Guidance:ultrasound guided    ULTRASOUND INTERPRETATION.  Using ultrasound guidance a 20 G gauge needle was placed in close proximity to the nerve, at which point, under ultrasound guidance anesthetic was injected in the area of the nerve and spread of the anesthesia was seen on ultrasound in close proximity thereto.  There were no abnormalities seen on ultrasound; a digital image was taken; and the patient tolerated the procedure with no complications. Images:still images obtained    Laterality:Bilateral  Block Type:TAP  Injection Technique:single-shot  Needle Type:echogenic  Needle Gauge:20 G  Resistance on Injection: none    Medications Used: bupivacaine PF (MARCAINE) injection 0.5% - Injection   15 mL - 2/26/2024 11:40:00 AM   15 mL - 2/26/2024 11:45:00 AM      Medications  Preservative Free Saline:30ml  Comment:Block Injection: LA dose divided between Right and Left Block  0.5% Bupivacaine diluted to make 0.25%   Adjuncts per total volume of LA:    Decadron 10 mg PSF      If required, intravenous sedation was given -- see meds on anesthesia record.    Post Assessment  Injection Assessment: negative aspiration for heme, no paresthesia on injection and incremental injection  Patient Tolerance:comfortable throughout  block  Complications:no  Additional Notes  Procedure:      BILATERAL TAP BLOCKS                             Patient analgesia was achieved with General Anesthesia    The pt was placed in the Supine Position and under Ultrasound guidance, an echogenic or touhy needle was advanced with Normal Saline hydro dissection of tissue.  The Internal Oblique and Transversus Abdominus muscles were visualized.  At or before the aponeurosis of Internal Oblique, the local anesthetic spread was visualized in the Transversus Abdominus Plane. Injection was made incrementally with aspiration every 5 mls.  There was no intravascular injection;  injection pressure was normal; there was no neural injection; and the procedure was completed without difficulty.

## 2024-02-26 NOTE — PROGRESS NOTES
GYN Office Visit    Subjective   Chief Complaint   Patient presents with    Follow-up     TVS done today for abnormal bleeding      Pao Modi is a 43 y.o. year old  presenting for follow-up bleeding + fibroids.    She was initially scheduled for hysterectomy in , but changed her mind.  She reports ongoing issues with bleeding since that time.  She was recently seen in the emergency room for bleeding and pain.  She would like to discuss treatment options again today. She does have a history of endometrial ablation in .  She had very heavy bleeding requiring blood transfusions prior to the ablation.  She was never amenorrheic after the ablation procedure.  In 2014/15, she had two large surgeries for gallbladder cancer.  She did receive chemotherapy in  and was amenorrheic for 8 months or so.  She does have pain and bloating with her bleeding.  Previous tubal ligation. Her bleeding symptoms affect her ability to participate in daily life activities.  An endometrial biopsy was reassuring at  in .     She denies sexual dysfunction. She denies urinary complaints including incontinence.     OB Hx: 3 vaginal births, 1   Contraception: Tubal ligation  Pap smear: NEG Cotesting   Mammogram: , sister in her 40s had breast cancer, negative genetic testing  Colonoscopy:   DEXA Scan: Never    Past Medical History:   Diagnosis Date    Abnormal Pap smear of cervix     ACL injury tear     left knee    ADHD (attention deficit hyperactivity disorder)     Cervical dysplasia     Depression     Fluid retention     left leg and knee    Gallbladder cancer     found incidentally at the time of cholecystectomy.  Treated with chemo x 3 months prior to undergoing definitive liver resection with hepaticojejunoscopy and lymphadenectomy. treated at .     History of fracture     AS A CHILD RIGHT WRIST, RIGHT FOOT    History of transfusion     HPV (human papilloma virus) infection     Hx  antineoplastic chemo     Hyperlipidemia     Migraines     Multiple gestation     Panic disorder     Tattoo     Torn meniscus     left       Past Surgical History:   Procedure Laterality Date     SECTION      X1    CHOLECYSTECTOMY WITH COMMON BILE DUCT EXPLORATION  10/2014    COLONOSCOPY      COLONOSCOPY N/A 2018    Procedure: COLONOSCOPY;  Surgeon: Liz Carcamo MD;  Location: [facility] ENDOSCOPY;  Service: Gastroenterology    COLONOSCOPY N/A 10/16/2023    Procedure: COLONOSCOPY;  Surgeon: Liz Carcamo MD;  Location: [facility] ENDOSCOPY;  Service: Gastroenterology;  Laterality: N/A;    D & C HYSTEROSCOPY ENDOMETRIAL ABLATION      ENDOSCOPY      ERCP      WITH STENT PLACEMENT FOR GALL STONE, PRIOR TO LAP CHOLEY AND DIAGNOSIS OF GB CANCER    LIVER RESECTION      for GB cancer, has hepaticojejunostomy    PORTACATH PLACEMENT      TUBAL ABDOMINAL LIGATION      VENOUS ACCESS DEVICE (PORT) REMOVAL      WISDOM TOOTH EXTRACTION         Family History   Problem Relation Age of Onset    Breast cancer Sister         DCIS    Heart failure Mother          at 74    Diabetes Mother     Hypertension Mother     Heart failure Father         treated fro SCC anal canal.  at 80.     Prostate cancer Father     Diabetes Father         Social History     Tobacco Use    Smoking status: Former     Packs/day: 0.50     Years: 10.00     Additional pack years: 0.00     Total pack years: 5.00     Types: Cigarettes     Quit date:      Years since quittin.1    Smokeless tobacco: Never    Tobacco comments:     TRANSITIONED TO ECIG APPROXIMATELY 10 YEARS AGO.   Vaping Use    Vaping Use: Every day    Substances: Nicotine   Substance Use Topics    Alcohol use: No    Drug use: No       (Not in a hospital admission)      Ciprofloxacin and Penicillins    No current facility-administered medications on file prior to visit.     Current Outpatient Medications on File Prior to Visit   Medication Sig Dispense Refill  "   amphetamine-dextroamphetamine (ADDERALL) 30 MG tablet Take 1 tablet by mouth Daily.      buPROPion XL (WELLBUTRIN XL) 300 MG 24 hr tablet Take 1 tablet by mouth Every Morning.  0    clonazePAM (KlonoPIN) 0.5 MG tablet Take 1 tablet by mouth 2 (Two) Times a Day As Needed for Anxiety.      sertraline (ZOLOFT) 50 MG tablet Take 1 tablet by mouth Daily.      ubrogepant tablet TAKE ONE TABLET AT ONSET OF HEADACHE. MAY REPEAT IN 2 HOURS IF NEEDED. MAX DOSE IN 24 HOURS         Social History    Tobacco Use      Smoking status: Former        Packs/day: 0.50        Years: 10.00        Additional pack years: 0.00        Total pack years: 5.00        Types: Cigarettes        Quit date:         Years since quittin.1      Smokeless tobacco: Never      Tobacco comments: TRANSITIONED TO ECIG APPROXIMATELY 10 YEARS AGO.         Objective   Ht 172.7 cm (68\")   Wt 103 kg (227 lb)   LMP 2024   BMI 34.52 kg/m²     Physical Exam:  General Appearance: alert, pleasant, appears stated age, interactive and cooperative         Medical Decision Making:    I reviewed my most recent note, her ultrasound from today and the endometrial biopsy result from  performed at .    Ultrasound:  Mildly enlarged, anteverted uterus with 2 dominant fibroids present measuring 3 cm and 5.2 cm respectively.  Additionally, there is a 2 cm cystic lesion noted near the fundus which may reflect another small fibroid.  The uterine length measures 11.8 cm. The endometrium measures 11.7 mm.  Both ovaries contain small simple follicles/cysts.  Both ovaries have normal vascularity.  No free fluid in the cul-de-sac.    Assessment   Abnormal uterine bleeding  Menorrhagia with irregular cycle  Pelvic pain and bloating  Previous endometrial ablation and tubal ligation  Uterine fibroids     Plan    Orders Placed This Encounter   Procedures    US Non-ob Transvaginal     Order Specific Question:   Reason for Exam:     Answer:   fibroids     Order " Specific Question:   Release to patient     Answer:   Routine Release [[id number]]       Medication Management: None    Procedures Performed: None    We reviewed her situation in detail today.  We reviewed her ultrasound findings which do show progression of her previously noted fibroids. Adenomyosis may also be in play. We discussed treatment options in detail today.  We reviewed hysterectomy vs a hysteroscopic procedure with Cerene.  She would like to avoid hysterectomy if possible.  We discussed medications that can potentially shrink the fibroids and improve bleeding. Ultimately, after reviewing the risks and benefits of these various approaches, the patient opted for hysteroscopy with possible MyoSure, dilation and curettage, endometrial ablation with Cerene.  Risks for the procedure were reviewed in detail today.  If that procedure is possible, we will [place] back around to discuss medication for fibroids and serial ultrasounds to monitor fibroids moving forward.  We discussed that if the hysteroscopic procedure and Cerene ablation is not feasible/prudent, we will convert to a hysterectomy at that time.  We discussed ovarian preservation, risks for hysterectomy, possible need for conversion to an open procedure and the recovery process.  All questions answered.    Edgar Chavez MD  Obstetrics and Gynecology  [facility]

## 2024-02-26 NOTE — PAYOR COMM NOTE
"TO:BC  FROM:GEMINI TURNER, RN PHONE [phone]  IN CLINICALS REF# [id number]    Walter Gibson (43 y.o. Female)       Date of Birth   1980    Social Security Number       Address   [address] [place] [address]    Home Phone   [phone]    MRN   [id number]       Roman Catholic   None    Marital Status                               Admission Date   2/26/24    Admission Type   Elective    Admitting Provider   Edgar Chavez MD    Attending Provider   Edgar Chavez MD    Department, Room/Bed   [facility], W207/1       Discharge Date       Discharge Disposition       Discharge Destination                                 Attending Provider: Edgar Chavez MD    Allergies: Ciprofloxacin, Penicillins    Isolation: None   Infection: None   Code Status: CPR    Ht: 172.7 cm (68\")   Wt: 103 kg (227 lb)    Admission Cmt: None   Principal Problem: S/P abdominal supracervical subtotal hysterectomy [Z90.711]                   Active Insurance as of 2/26/2024       Primary Coverage       Payor Plan Insurance Group Employer/Plan Group    ANTHEM MEDICARE REPLACEMENT ANTHEM MEDICARE ADVANTAGE [id number]       Payor Plan Address Payor Plan Phone Number Payor Plan Fax Number Effective Dates    [address] [phone]  9/1/2022 - None Entered    [facility] [address]         Subscriber Name Subscriber Birth Date Member ID       WALTER GIBSON 1980 [id number]               Secondary Coverage       Payor Plan Insurance Group Employer/Plan Group    [place]        Payor Plan Address Payor Plan Phone Number Payor Plan Fax Number Effective Dates    [address] [phone]  4/10/2022 - None Entered    [facility] [id number]         Subscriber Name Subscriber Birth Date Member ID       WALTER GIBSON 1980 [id number]                     Emergency Contacts        (Rel.) Home Phone Work Phone Mobile Phone    " Elio Modi (Spouse) -- -- [phone]                 History & Physical        Edgar Chavez MD at 24 0745          GYNECOLOGY HISTORY AND PHYSICAL    CHIEF COMPLAINT: Scheduled surgery    DIAGNOSIS:  Abnormal uterine bleeding  Menorrhagia with irregular cycle  Pelvic pain and bloating  Previous endometrial ablation and tubal ligation  Uterine fibroids    ASSESSMENT/PLAN     43 y.o.  female who presents for scheduled HYSTEROSCOPY WITH MYOSURE, DILATION AND CURETTAGE, ENDOMETRIAL ABLATION WITH CERENE, POSSIBLE CONVERSION TO A TOTAL LAPAROSCOPIC HYSTERECTOMY WITH BILATERAL SALPINGECTOMY AND CYSTOSCOPY AND ALL OTHER INDICATED PROCEDURES .    - Proceed to the OR for scheduled surgery  - Risks for the procedure reviewed  - SCDs for DVT ppx  - Antibiotics: Ancef 2g    HISTORY OF PRESENT ILLNESS     43 y.o.  female who presents for the scheduled procedure listed above.    She has no complaints today and there are no interval changes to the history.    REVIEW OF SYSTEMS: A complete review of systems was performed and was specifically negative for headache, changes in vision, RUQ pain, shortness of breath, chest pain, lower extremity edema and dysuria.     HISTORY:  Obstetrical History:  OB History    Para Term  AB Living   4 4       5   SAB IAB Ectopic Molar Multiple Live Births                    # Outcome Date GA Lbr Julio C/2nd Weight Sex Delivery Anes PTL Lv   4 Para            3 Para            2 Para            1 Para                Past Medical History:  Past Medical History:   Diagnosis Date    Abnormal Pap smear of cervix     ACL injury tear     left knee    ADHD (attention deficit hyperactivity disorder)     Cervical dysplasia     Depression     Fluid retention     left leg and knee    Gallbladder cancer     found incidentally at the time of cholecystectomy.  Treated with chemo x 3 months prior to undergoing definitive liver resection with hepaticojejunoscopy and  lymphadenectomy. treated at .     History of fracture     AS A CHILD RIGHT WRIST, RIGHT FOOT    History of transfusion     HPV (human papilloma virus) infection     Hx antineoplastic chemo     Hyperlipidemia     Migraines     Multiple gestation     Panic disorder     Tattoo     Torn meniscus     left       Past Surgical History:  Past Surgical History:   Procedure Laterality Date     SECTION      X1    CHOLECYSTECTOMY WITH COMMON BILE DUCT EXPLORATION  10/2014    COLONOSCOPY      COLONOSCOPY N/A 2018    Procedure: COLONOSCOPY;  Surgeon: Liz Carcamo MD;  Location: [facility] ENDOSCOPY;  Service: Gastroenterology    COLONOSCOPY N/A 10/16/2023    Procedure: COLONOSCOPY;  Surgeon: Liz Carcamo MD;  Location: [facility] ENDOSCOPY;  Service: Gastroenterology;  Laterality: N/A;    D & C HYSTEROSCOPY ENDOMETRIAL ABLATION      ENDOSCOPY      ERCP      WITH STENT PLACEMENT FOR GALL STONE, PRIOR TO LAP CHOLEY AND DIAGNOSIS OF GB CANCER    LIVER RESECTION      for GB cancer, has hepaticojejunostomy    PORTACATH PLACEMENT      TUBAL ABDOMINAL LIGATION      VENOUS ACCESS DEVICE (PORT) REMOVAL      WISDOM TOOTH EXTRACTION         Social History:  Social History     Tobacco Use    Smoking status: Former     Packs/day: 0.50     Years: 10.00     Additional pack years: 0.00     Total pack years: 5.00     Types: Cigarettes     Quit date:      Years since quittin.1    Smokeless tobacco: Never    Tobacco comments:     TRANSITIONED TO ECIG APPROXIMATELY 10 YEARS AGO.   Vaping Use    Vaping Use: Every day    Substances: Nicotine   Substance Use Topics    Alcohol use: No    Drug use: No       Family History  Family History   Problem Relation Age of Onset    Breast cancer Sister         DCIS    Heart failure Mother          at 74    Diabetes Mother     Hypertension Mother     Heart failure Father         treated fro SCC anal canal.  at 80.     Prostate cancer Father     Diabetes Father          Allergies:   Allergies   Allergen Reactions    Ciprofloxacin Rash    Penicillins Rash       OBJECTIVE   VITALS:  Temp:  [97 °F (36.1 °C)] 97 °F (36.1 °C)  Heart Rate:  [79] 79  Resp:  [16] 16  BP: (137)/(106) 137/106    PHYSICAL EXAM:  GENERAL: NAD, alert  CHEST: No increased work of breathing, CTAB  CV: RRR, WWP  ABDOMEN: Soft, NTTP  EXTREMITIES:  Warm and well-perfused, nontender, nonedematous  NEURO: AAO x 3, CN II-XII grossly intact    No current facility-administered medications on file prior to encounter.     Current Outpatient Medications on File Prior to Encounter   Medication Sig Dispense Refill    amphetamine-dextroamphetamine (ADDERALL) 30 MG tablet Take 1 tablet by mouth Daily.      buPROPion XL (WELLBUTRIN XL) 300 MG 24 hr tablet Take 1 tablet by mouth Every Morning.  0    clonazePAM (KlonoPIN) 0.5 MG tablet Take 1 tablet by mouth 2 (Two) Times a Day As Needed for Anxiety.      hydroCHLOROthiazide 25 MG tablet Take 1 tablet by mouth Daily.      hydroxychloroquine (PLAQUENIL) 200 MG tablet Take 1 tablet by mouth Daily.      leflunomide (ARAVA) 10 MG tablet Take 1 tablet by mouth Daily.      sertraline (ZOLOFT) 50 MG tablet Take 1 tablet by mouth Daily.      ubrogepant tablet TAKE ONE TABLET AT ONSET OF HEADACHE. MAY REPEAT IN 2 HOURS IF NEEDED. MAX DOSE IN 24 HOURS         DIAGNOSTIC STUDIES:  Results from last 7 days   Lab Units 02/22/24  1113   WBC 10*3/mm3 12.10*   HEMOGLOBIN g/dL 15.6   HEMATOCRIT % 44.7   PLATELETS 10*3/mm3 262   SODIUM mmol/L 135*   POTASSIUM mmol/L 3.2*   CHLORIDE mmol/L 95*   CO2 mmol/L 32.8*   BUN mg/dL 9   CREATININE mg/dL 0.79   GLUCOSE mg/dL 107*   CALCIUM mg/dL 9.3       Recent Results (from the past 24 hour(s))   Type & Screen    Collection Time: 02/26/24  6:39 AM    Specimen: Blood   Result Value Ref Range    ABO Type O     RH type Positive        Edgar Chavez MD  Obstetrics and Gynecology  [facility]       Electronically signed by Edgar Chavez  MD at 02/26/24 0733       Vital Signs (last day)       Date/Time Temp Temp src Pulse Resp BP Patient Position SpO2    02/26/24 1421 -- -- 76 17 129/89 Lying 97    02/26/24 1351 -- -- 78 16 121/75 Lying 98    02/26/24 1321 97.3 (36.3) Oral 73 15 124/82 Lying 96    02/26/24 1300 97.3 (36.3) Temporal 73 16 124/76 Lying 95    02/26/24 1250 -- -- 75 16 104/60 Lying 94    02/26/24 1240 -- -- 73 16 99/55 Lying 94    02/26/24 1230 -- -- 76 16 100/56 Lying 95    02/26/24 1220 -- -- 77 14 128/80 Lying 97    02/26/24 1219 -- -- -- -- -- -- 98    02/26/24 1215 -- -- 71 16 121/75 -- 100    02/26/24 1210 -- -- 72 14 120/78 Lying 100    02/26/24 1205 -- -- 71 14 116/75 Lying 100    02/26/24 1200 -- -- 73 14 120/78 Lying 100    02/26/24 1155 97 (36.1) Temporal 72 14 126/78 Lying 100    02/26/24 0632 97 (36.1) Temporal 79 16 137/106 Sitting 98          Current Facility-Administered Medications   Medication Dose Route Frequency Provider Last Rate Last Admin    acetaminophen (TYLENOL) tablet 1,000 mg  1,000 mg Oral Q8H Edgar Chavez MD        aluminum-magnesium hydroxide-simethicone (MAALOX MAX) 400-400-40 MG/5ML suspension 15 mL  15 mL Oral Q6H Edgar Chavez MD   15 mL at 02/26/24 1448    [START ON 2/27/2024] buPROPion XL (WELLBUTRIN XL) 24 hr tablet 300 mg  300 mg Oral QAM Edgar Chavez MD        calcium carbonate (TUMS) chewable tablet 500 mg (200 mg elemental)  1 tablet Oral TID PRN Edgar Chavez MD        clonazePAM (KlonoPIN) tablet 0.5 mg  0.5 mg Oral BID PRN Edgar Chavez MD        dextrose 5 % and sodium chloride 0.45 % infusion  75 mL/hr Intravenous Continuous Edgar Chavez MD 75 mL/hr at 02/26/24 1447 75 mL/hr at 02/26/24 1447    docusate sodium (COLACE) capsule 100 mg  100 mg Oral BID Egdar Chavez MD        famotidine (PEPCID) tablet 20 mg  20 mg Oral BID Edgar Chavez MD        HYDROmorphone (DILAUDID) injection 0.5 mg  0.5 mg Intravenous Q2H PRN Edgar Chavez  MD Darci   0.5 mg at 02/26/24 1527    And    naloxone (NARCAN) injection 0.1 mg  0.1 mg Intravenous Q5 Min PRN Edgar Chavez MD        [START ON 2/27/2024] hydroxychloroquine (PLAQUENIL) tablet 200 mg  200 mg Oral Daily Edgar Chavez MD        ibuprofen (ADVIL,MOTRIN) tablet 800 mg  800 mg Oral Q8H Edgar Chavez MD   800 mg at 02/26/24 1447    ondansetron ODT (ZOFRAN-ODT) disintegrating tablet 4 mg  4 mg Oral Q6H PRN Edgar Chavez MD        Or    ondansetron (ZOFRAN) injection 4 mg  4 mg Intravenous Q6H PRN Edgar Chavez MD        oxyCODONE (ROXICODONE) immediate release tablet 5 mg  5 mg Oral Q4H PRN Edgar Chavez MD        Or    oxyCODONE (ROXICODONE) immediate release tablet 10 mg  10 mg Oral Q4H PRN Edgar Chavez MD   10 mg at 02/26/24 1447    promethazine (PHENERGAN) suppository 12.5 mg  12.5 mg Rectal Q6H PRN Edgar Chavez MD        Or    promethazine (PHENERGAN) tablet 12.5 mg  12.5 mg Oral Q6H PRN Edgar Chavez MD        [START ON 2/27/2024] sertraline (ZOLOFT) tablet 50 mg  50 mg Oral Daily Edgar Chavez MD        simethicone (MYLICON) chewable tablet 80 mg  80 mg Oral 4x Daily PREdgar Buchanan MD         Lab Results (last 24 hours)       Procedure Component Value Units Date/Time    TISSUE EXAM, [facility]) [[id number]] Collected: 02/26/24 1145    Specimen: Tissue from Uterus, Cervix, Bilateral Fallopian Tubes  Updated: 02/26/24 1323          Imaging Results (Last 24 Hours)       ** No results found for the last 24 hours. **          Operative/Procedure Notes (last 24 hours)  Notes from 02/25/24 1540 through 02/26/24 1540   No notes of this type exist for this encounter.       Physician Progress Notes (last 24 hours)  Notes from 02/25/24 1540 through 02/26/24 1540   No notes of this type exist for this encounter.

## 2024-02-26 NOTE — ANESTHESIA POSTPROCEDURE EVALUATION
Patient: Pao Modi    Procedure Summary       Date: 02/26/24 Room / Location: [facility] OR  /  [facility]    Anesthesia Start: 0727 Anesthesia Stop: 1153    Procedures:       DIAGNOSTIC HYSTEROSCOPY (Vagina)      DIAGNOSTIC LAPAROSCOPY, CONVERSION TO OPEN SUPRACERVICAL HYSTERECTOMY, BILATERAL SALPINGECTOMY, SIGNIFICANT LYSIS OF ADHESIONS, CYSTOSCOPY (Abdomen) Diagnosis:       Abnormal uterine bleeding (AUB)      History of endometrial ablation      Pelvic pain      (Abnormal uterine bleeding (AUB) [N93.9])      (History of endometrial ablation [Z98.890])      (Pelvic pain [R10.2])    Surgeons: Edgar Chavez MD Provider: Jaylyn Ferraro CRNA    Anesthesia Type: general, regional ASA Status: 3            Anesthesia Type: general, regional    Vitals  Vitals Value Taken Time   /56 02/26/24 1230   Temp 97 °F (36.1 °C) 02/26/24 1155   Pulse 73 02/26/24 1237   Resp 16 02/26/24 1230   SpO2 95 % 02/26/24 1237   Vitals shown include unfiled device data.        Post Anesthesia Care and Evaluation    Patient location during evaluation: PACU  Patient participation: complete - patient participated  Level of consciousness: awake and alert  Pain score: 2  Pain management: satisfactory to patient    Airway patency: patent  Anesthetic complications: No anesthetic complications  PONV Status: none  Cardiovascular status: acceptable and stable  Respiratory status: acceptable  Hydration status: acceptable    Comments: Vitals signs as noted in nursing documentation as per protocol.

## 2024-02-27 LAB
ANION GAP SERPL CALCULATED.3IONS-SCNC: 12.2 MMOL/L (ref 5–15)
BASOPHILS # BLD AUTO: 0.03 10*3/MM3 (ref 0–0.2)
BASOPHILS NFR BLD AUTO: 0.1 % (ref 0–1.5)
BUN SERPL-MCNC: 7 MG/DL (ref 6–20)
BUN/CREAT SERPL: 11.3 (ref 7–25)
CALCIUM SPEC-SCNC: 7.9 MG/DL (ref 8.6–10.5)
CHLORIDE SERPL-SCNC: 94 MMOL/L (ref 98–107)
CO2 SERPL-SCNC: 29.8 MMOL/L (ref 22–29)
CREAT SERPL-MCNC: 0.62 MG/DL (ref 0.57–1)
DEPRECATED RDW RBC AUTO: 37.9 FL (ref 37–54)
EGFRCR SERPLBLD CKD-EPI 2021: 112.8 ML/MIN/1.73
EOSINOPHIL # BLD AUTO: 0 10*3/MM3 (ref 0–0.4)
EOSINOPHIL NFR BLD AUTO: 0 % (ref 0.3–6.2)
ERYTHROCYTE [DISTWIDTH] IN BLOOD BY AUTOMATED COUNT: 12.7 % (ref 12.3–15.4)
GLUCOSE SERPL-MCNC: 165 MG/DL (ref 65–99)
HCT VFR BLD AUTO: 29.3 % (ref 34–46.6)
HGB BLD-MCNC: 10.4 G/DL (ref 12–15.9)
IMM GRANULOCYTES # BLD AUTO: 0.2 10*3/MM3 (ref 0–0.05)
IMM GRANULOCYTES NFR BLD AUTO: 0.9 % (ref 0–0.5)
LYMPHOCYTES # BLD AUTO: 0.99 10*3/MM3 (ref 0.7–3.1)
LYMPHOCYTES NFR BLD AUTO: 4.7 % (ref 19.6–45.3)
MAGNESIUM SERPL-MCNC: 1.5 MG/DL (ref 1.6–2.6)
MCH RBC QN AUTO: 30 PG (ref 26.6–33)
MCHC RBC AUTO-ENTMCNC: 35.5 G/DL (ref 31.5–35.7)
MCV RBC AUTO: 84.4 FL (ref 79–97)
MONOCYTES # BLD AUTO: 1.09 10*3/MM3 (ref 0.1–0.9)
MONOCYTES NFR BLD AUTO: 5.1 % (ref 5–12)
NEUTROPHILS NFR BLD AUTO: 18.9 10*3/MM3 (ref 1.7–7)
NEUTROPHILS NFR BLD AUTO: 89.2 % (ref 42.7–76)
NRBC BLD AUTO-RTO: 0 /100 WBC (ref 0–0.2)
PLATELET # BLD AUTO: 253 10*3/MM3 (ref 140–450)
PMV BLD AUTO: 9.6 FL (ref 6–12)
POTASSIUM SERPL-SCNC: 2.9 MMOL/L (ref 3.5–5.2)
RBC # BLD AUTO: 3.47 10*6/MM3 (ref 3.77–5.28)
REF LAB TEST METHOD: NORMAL
SODIUM SERPL-SCNC: 136 MMOL/L (ref 136–145)
WBC NRBC COR # BLD AUTO: 21.21 10*3/MM3 (ref 3.4–10.8)

## 2024-02-27 PROCEDURE — 25010000002 HYDROMORPHONE 1 MG/ML SOLUTION: Performed by: OBSTETRICS & GYNECOLOGY

## 2024-02-27 PROCEDURE — 63710000001 ONDANSETRON ODT 4 MG TABLET DISPERSIBLE: Performed by: OBSTETRICS & GYNECOLOGY

## 2024-02-27 PROCEDURE — 85025 COMPLETE CBC W/AUTO DIFF WBC: CPT | Performed by: OBSTETRICS & GYNECOLOGY

## 2024-02-27 PROCEDURE — 83735 ASSAY OF MAGNESIUM: CPT | Performed by: OBSTETRICS & GYNECOLOGY

## 2024-02-27 PROCEDURE — 80048 BASIC METABOLIC PNL TOTAL CA: CPT | Performed by: OBSTETRICS & GYNECOLOGY

## 2024-02-27 PROCEDURE — 25010000002 MAGNESIUM SULFATE 2 GM/50ML SOLUTION: Performed by: OBSTETRICS & GYNECOLOGY

## 2024-02-27 PROCEDURE — 25010000002 POTASSIUM CHLORIDE 10 MEQ/100ML SOLUTION: Performed by: OBSTETRICS & GYNECOLOGY

## 2024-02-27 PROCEDURE — 25010000002 CEFAZOLIN SODIUM-DEXTROSE 2-3 GM-%(50ML) RECONSTITUTED SOLUTION: Performed by: OBSTETRICS & GYNECOLOGY

## 2024-02-27 RX ORDER — POTASSIUM CHLORIDE 29.8 MG/ML
20 INJECTION INTRAVENOUS ONCE
Status: DISCONTINUED | OUTPATIENT
Start: 2024-02-27 | End: 2024-02-27

## 2024-02-27 RX ORDER — POTASSIUM CHLORIDE 20 MEQ/1
40 TABLET, EXTENDED RELEASE ORAL 2 TIMES DAILY WITH MEALS
Status: DISCONTINUED | OUTPATIENT
Start: 2024-02-27 | End: 2024-02-28 | Stop reason: HOSPADM

## 2024-02-27 RX ORDER — MAGNESIUM SULFATE HEPTAHYDRATE 40 MG/ML
2 INJECTION, SOLUTION INTRAVENOUS ONCE
Status: COMPLETED | OUTPATIENT
Start: 2024-02-27 | End: 2024-02-27

## 2024-02-27 RX ORDER — POTASSIUM CHLORIDE 7.45 MG/ML
10 INJECTION INTRAVENOUS
Status: COMPLETED | OUTPATIENT
Start: 2024-02-27 | End: 2024-02-27

## 2024-02-27 RX ORDER — CEFAZOLIN SODIUM 2 G/50ML
2000 SOLUTION INTRAVENOUS ONCE
Status: COMPLETED | OUTPATIENT
Start: 2024-02-27 | End: 2024-02-27

## 2024-02-27 RX ADMIN — FAMOTIDINE 20 MG: 20 TABLET, FILM COATED ORAL at 09:14

## 2024-02-27 RX ADMIN — DOCUSATE SODIUM 100 MG: 100 CAPSULE, LIQUID FILLED ORAL at 09:14

## 2024-02-27 RX ADMIN — ALUMINUM HYDROXIDE, MAGNESIUM HYDROXIDE, AND DIMETHICONE 15 ML: 400; 400; 40 SUSPENSION ORAL at 09:14

## 2024-02-27 RX ADMIN — IBUPROFEN 800 MG: 800 TABLET, FILM COATED ORAL at 15:44

## 2024-02-27 RX ADMIN — ALUMINUM HYDROXIDE, MAGNESIUM HYDROXIDE, AND DIMETHICONE 15 ML: 400; 400; 40 SUSPENSION ORAL at 15:43

## 2024-02-27 RX ADMIN — OXYCODONE HYDROCHLORIDE 10 MG: 5 TABLET ORAL at 03:26

## 2024-02-27 RX ADMIN — CLONAZEPAM 0.5 MG: 0.5 TABLET ORAL at 09:26

## 2024-02-27 RX ADMIN — MAGNESIUM SULFATE HEPTAHYDRATE 2 G: 40 INJECTION, SOLUTION INTRAVENOUS at 17:28

## 2024-02-27 RX ADMIN — ACETAMINOPHEN 1000 MG: 500 TABLET ORAL at 10:25

## 2024-02-27 RX ADMIN — HYDROXYCHLOROQUINE SULFATE 200 MG: 200 TABLET ORAL at 09:15

## 2024-02-27 RX ADMIN — CLONAZEPAM 0.5 MG: 0.5 TABLET ORAL at 21:02

## 2024-02-27 RX ADMIN — SIMETHICONE 80 MG: 80 TABLET, CHEWABLE ORAL at 10:28

## 2024-02-27 RX ADMIN — POTASSIUM CHLORIDE 40 MEQ: 1500 TABLET, EXTENDED RELEASE ORAL at 09:34

## 2024-02-27 RX ADMIN — ONDANSETRON 4 MG: 4 TABLET, ORALLY DISINTEGRATING ORAL at 07:49

## 2024-02-27 RX ADMIN — FAMOTIDINE 20 MG: 20 TABLET, FILM COATED ORAL at 20:47

## 2024-02-27 RX ADMIN — DOCUSATE SODIUM 100 MG: 100 CAPSULE, LIQUID FILLED ORAL at 20:47

## 2024-02-27 RX ADMIN — ALUMINUM HYDROXIDE, MAGNESIUM HYDROXIDE, AND DIMETHICONE 15 ML: 400; 400; 40 SUSPENSION ORAL at 20:47

## 2024-02-27 RX ADMIN — BUPROPION HYDROCHLORIDE 300 MG: 150 TABLET, EXTENDED RELEASE ORAL at 07:49

## 2024-02-27 RX ADMIN — POTASSIUM CHLORIDE 10 MEQ: 7.46 INJECTION, SOLUTION INTRAVENOUS at 10:25

## 2024-02-27 RX ADMIN — OXYCODONE HYDROCHLORIDE 10 MG: 5 TABLET ORAL at 17:31

## 2024-02-27 RX ADMIN — IBUPROFEN 800 MG: 800 TABLET, FILM COATED ORAL at 05:55

## 2024-02-27 RX ADMIN — MAGNESIUM OXIDE TAB 400 MG (241.3 MG ELEMENTAL MG) 400 MG: 400 (241.3 MG) TAB at 17:28

## 2024-02-27 RX ADMIN — HYDROMORPHONE HYDROCHLORIDE 0.5 MG: 1 INJECTION, SOLUTION INTRAMUSCULAR; INTRAVENOUS; SUBCUTANEOUS at 05:55

## 2024-02-27 RX ADMIN — POTASSIUM CHLORIDE 10 MEQ: 7.46 INJECTION, SOLUTION INTRAVENOUS at 11:48

## 2024-02-27 RX ADMIN — IBUPROFEN 800 MG: 800 TABLET, FILM COATED ORAL at 21:02

## 2024-02-27 RX ADMIN — POTASSIUM CHLORIDE 40 MEQ: 1500 TABLET, EXTENDED RELEASE ORAL at 17:28

## 2024-02-27 RX ADMIN — ACETAMINOPHEN 1000 MG: 500 TABLET ORAL at 03:26

## 2024-02-27 RX ADMIN — DEXTROSE AND SODIUM CHLORIDE 75 ML/HR: 5; 450 INJECTION, SOLUTION INTRAVENOUS at 10:24

## 2024-02-27 RX ADMIN — OXYCODONE HYDROCHLORIDE 10 MG: 5 TABLET ORAL at 12:59

## 2024-02-27 RX ADMIN — SERTRALINE HYDROCHLORIDE 50 MG: 50 TABLET ORAL at 09:26

## 2024-02-27 RX ADMIN — POTASSIUM CHLORIDE 10 MEQ: 7.46 INJECTION, SOLUTION INTRAVENOUS at 09:27

## 2024-02-27 RX ADMIN — ALUMINUM HYDROXIDE, MAGNESIUM HYDROXIDE, AND DIMETHICONE 15 ML: 400; 400; 40 SUSPENSION ORAL at 03:26

## 2024-02-27 RX ADMIN — OXYCODONE HYDROCHLORIDE 10 MG: 5 TABLET ORAL at 22:29

## 2024-02-27 RX ADMIN — CEFAZOLIN SODIUM 2000 MG: 2 SOLUTION INTRAVENOUS at 09:27

## 2024-02-27 NOTE — PLAN OF CARE
Goal Outcome Evaluation:  Plan of Care Reviewed With: patient         Patient VSS; patient pain well-controlled; patient able to void on her own. Patient has ambulated in hallway. IV was replaced by Laureano Munoz CRNA. Patient continues to transition appropriately.

## 2024-02-27 NOTE — PROGRESS NOTES
KATIA Kevin Modi  : 1980  MRN: [id number]  CSN: [id number]    Post-operative Day #1  Subjective   Her pain is well controlled.  Her catheter was removed this am and she hasn't voided yet. She is passing gas and has not had a bowel movement.      Objective     Min/max vitals past 24 hours:   Temp  Min: 97 °F (36.1 °C)  Max: 97.9 °F (36.6 °C)  BP  Min: 99/55  Max: 144/88  Pulse  Min: 71  Max: 91  Resp  Min: 14  Max: 18        I/O last 3 completed shifts:  In: 2400 [I.V.:2400]  Out:  [Urine:1160; Blood:850]    General: well developed; well nourished  no acute distress   Resp: breathing is unlabored   CV: Not performed.   Abdomen: soft, non-tender; no masses  incision is covered by bandage   Pelvic: Not performed   Ext: normal appearance with no cyanosis or edema and no calf tenderness       WBC   Date/Time Value Ref Range Status   2024 0619 21.21 (H) 3.40 - 10.80 10*3/mm3 Final   2024 2223 14.29 (H) 3.70 - 10.30 10*3/uL Final     Hemoglobin   Date/Time Value Ref Range Status   202419 10.4 (L) 12.0 - 15.9 g/dL Final   2024 2223 15.3 11.2 - 15.7 g/dL Final     Hematocrit   Date/Time Value Ref Range Status   202419 29.3 (L) 34.0 - 46.6 % Final   2024 2223 44.3 34.0 - 45.0 % Final     Platelets   Date/Time Value Ref Range Status   2024 253 140 - 450 10*3/mm3 Final   2024 2223 246 155 - 369 10*3/uL Final        Assessment   Post-op Day #1 S/P DX lap with conversion to open supracervical hysterectomy, DEMETRIO, drainage of right ovarian cysts, bilateral salpingectomy  Hypokalemia     Plan   Continue routine post-operative care  Potassium runs and po potassium  Magnesium level    Denice Leon, APRN  2024  09:43 EST

## 2024-02-27 NOTE — CONSULTS
Patient: Pao Modi  Procedure(s):  DIAGNOSTIC HYSTEROSCOPY  DIAGNOSTIC LAPAROSCOPY, CONVERSION TO OPEN SUPRACERVICAL HYSTERECTOMY, BILATERAL SALPINGECTOMY, SIGNIFICANT LYSIS OF ADHESIONS, CYSTOSCOPY  Anesthesia type: general, regional    Patient location: [facility] Surgical Floor  Last vitals:   Vitals:    02/27/24 0749   BP: 122/71   Pulse: 86   Resp:    Temp:    SpO2:      Level of consciousness: awake, alert, and oriented    Post-anesthesia pain: adequate analgesia  Airway patency: patent  Respiratory: unassisted  Cardiovascular: stable and blood pressure at baseline  Hydration: euvolemic    Anesthetic complications: no

## 2024-02-28 ENCOUNTER — READMISSION MANAGEMENT (OUTPATIENT)
Dept: CALL CENTER | Facility: HOSPITAL | Age: 44
End: 2024-02-28
Payer: MEDICARE

## 2024-02-28 VITALS
OXYGEN SATURATION: 94 % | RESPIRATION RATE: 16 BRPM | SYSTOLIC BLOOD PRESSURE: 118 MMHG | TEMPERATURE: 98.2 F | HEART RATE: 82 BPM | DIASTOLIC BLOOD PRESSURE: 78 MMHG

## 2024-02-28 PROBLEM — N93.9 UTERINE BLEEDING: Status: RESOLVED | Noted: 2024-02-26 | Resolved: 2024-02-28

## 2024-02-28 LAB
ALBUMIN SERPL-MCNC: 3.1 G/DL (ref 3.5–5.2)
ALBUMIN/GLOB SERPL: 1.4 G/DL
ALP SERPL-CCNC: 61 U/L (ref 39–117)
ALT SERPL W P-5'-P-CCNC: 14 U/L (ref 1–33)
ANION GAP SERPL CALCULATED.3IONS-SCNC: 5.5 MMOL/L (ref 5–15)
AST SERPL-CCNC: 15 U/L (ref 1–32)
BASOPHILS # BLD AUTO: 0.03 10*3/MM3 (ref 0–0.2)
BASOPHILS NFR BLD AUTO: 0.3 % (ref 0–1.5)
BILIRUB SERPL-MCNC: 0.2 MG/DL (ref 0–1.2)
BUN SERPL-MCNC: 8 MG/DL (ref 6–20)
BUN/CREAT SERPL: 13.8 (ref 7–25)
CALCIUM SPEC-SCNC: 7 MG/DL (ref 8.6–10.5)
CHLORIDE SERPL-SCNC: 102 MMOL/L (ref 98–107)
CO2 SERPL-SCNC: 31.5 MMOL/L (ref 22–29)
CREAT SERPL-MCNC: 0.58 MG/DL (ref 0.57–1)
DEPRECATED RDW RBC AUTO: 41.1 FL (ref 37–54)
EGFRCR SERPLBLD CKD-EPI 2021: 114.6 ML/MIN/1.73
EOSINOPHIL # BLD AUTO: 0.17 10*3/MM3 (ref 0–0.4)
EOSINOPHIL NFR BLD AUTO: 1.6 % (ref 0.3–6.2)
ERYTHROCYTE [DISTWIDTH] IN BLOOD BY AUTOMATED COUNT: 13.3 % (ref 12.3–15.4)
GLOBULIN UR ELPH-MCNC: 2.2 GM/DL
GLUCOSE SERPL-MCNC: 116 MG/DL (ref 65–99)
HCT VFR BLD AUTO: 26.6 % (ref 34–46.6)
HGB BLD-MCNC: 9.1 G/DL (ref 12–15.9)
IMM GRANULOCYTES # BLD AUTO: 0.11 10*3/MM3 (ref 0–0.05)
IMM GRANULOCYTES NFR BLD AUTO: 1 % (ref 0–0.5)
LYMPHOCYTES # BLD AUTO: 2.03 10*3/MM3 (ref 0.7–3.1)
LYMPHOCYTES NFR BLD AUTO: 19.2 % (ref 19.6–45.3)
MAGNESIUM SERPL-MCNC: 2.5 MG/DL (ref 1.6–2.6)
MCH RBC QN AUTO: 29.7 PG (ref 26.6–33)
MCHC RBC AUTO-ENTMCNC: 34.2 G/DL (ref 31.5–35.7)
MCV RBC AUTO: 86.9 FL (ref 79–97)
MONOCYTES # BLD AUTO: 0.82 10*3/MM3 (ref 0.1–0.9)
MONOCYTES NFR BLD AUTO: 7.8 % (ref 5–12)
NEUTROPHILS NFR BLD AUTO: 7.4 10*3/MM3 (ref 1.7–7)
NEUTROPHILS NFR BLD AUTO: 70.1 % (ref 42.7–76)
NRBC BLD AUTO-RTO: 0 /100 WBC (ref 0–0.2)
PLATELET # BLD AUTO: 205 10*3/MM3 (ref 140–450)
PMV BLD AUTO: 9.7 FL (ref 6–12)
POTASSIUM SERPL-SCNC: 3.3 MMOL/L (ref 3.5–5.2)
PROT SERPL-MCNC: 5.3 G/DL (ref 6–8.5)
RBC # BLD AUTO: 3.06 10*6/MM3 (ref 3.77–5.28)
SODIUM SERPL-SCNC: 139 MMOL/L (ref 136–145)
WBC NRBC COR # BLD AUTO: 10.56 10*3/MM3 (ref 3.4–10.8)

## 2024-02-28 PROCEDURE — 83735 ASSAY OF MAGNESIUM: CPT | Performed by: OBSTETRICS & GYNECOLOGY

## 2024-02-28 PROCEDURE — 80053 COMPREHEN METABOLIC PANEL: CPT | Performed by: OBSTETRICS & GYNECOLOGY

## 2024-02-28 PROCEDURE — 25010000002 ONDANSETRON PER 1 MG: Performed by: OBSTETRICS & GYNECOLOGY

## 2024-02-28 PROCEDURE — 99238 HOSP IP/OBS DSCHRG MGMT 30/<: CPT | Performed by: PHYSICIAN ASSISTANT

## 2024-02-28 PROCEDURE — 85025 COMPLETE CBC W/AUTO DIFF WBC: CPT | Performed by: OBSTETRICS & GYNECOLOGY

## 2024-02-28 RX ORDER — POTASSIUM CHLORIDE 20 MEQ/1
20 TABLET, EXTENDED RELEASE ORAL 2 TIMES DAILY
Qty: 10 TABLET | Refills: 0 | Status: SHIPPED | OUTPATIENT
Start: 2024-02-28

## 2024-02-28 RX ORDER — ONDANSETRON 4 MG/1
4 TABLET, FILM COATED ORAL EVERY 8 HOURS PRN
Qty: 20 TABLET | Refills: 2 | Status: SHIPPED | OUTPATIENT
Start: 2024-02-28

## 2024-02-28 RX ORDER — SIMETHICONE 80 MG
80 TABLET,CHEWABLE ORAL 4 TIMES DAILY PRN
Qty: 30 TABLET | Refills: 0 | Status: SHIPPED | OUTPATIENT
Start: 2024-02-28

## 2024-02-28 RX ORDER — IBUPROFEN 800 MG/1
800 TABLET ORAL EVERY 8 HOURS
Qty: 30 TABLET | Refills: 0 | Status: SHIPPED | OUTPATIENT
Start: 2024-02-28

## 2024-02-28 RX ORDER — PSEUDOEPHEDRINE HCL 30 MG
100 TABLET ORAL 2 TIMES DAILY
Qty: 60 CAPSULE | Refills: 1 | Status: SHIPPED | OUTPATIENT
Start: 2024-02-28

## 2024-02-28 RX ORDER — ACETAMINOPHEN 500 MG
1000 TABLET ORAL EVERY 8 HOURS
Qty: 60 TABLET | Refills: 0 | Status: SHIPPED | OUTPATIENT
Start: 2024-02-28

## 2024-02-28 RX ORDER — OXYCODONE HYDROCHLORIDE 5 MG/1
5 TABLET ORAL EVERY 6 HOURS PRN
Qty: 10 TABLET | Refills: 0 | Status: SHIPPED | OUTPATIENT
Start: 2024-02-28

## 2024-02-28 RX ADMIN — ONDANSETRON 4 MG: 2 INJECTION INTRAMUSCULAR; INTRAVENOUS at 09:11

## 2024-02-28 RX ADMIN — ACETAMINOPHEN 1000 MG: 500 TABLET ORAL at 09:52

## 2024-02-28 RX ADMIN — SERTRALINE HYDROCHLORIDE 50 MG: 50 TABLET ORAL at 08:28

## 2024-02-28 RX ADMIN — IBUPROFEN 800 MG: 800 TABLET, FILM COATED ORAL at 06:22

## 2024-02-28 RX ADMIN — OXYCODONE HYDROCHLORIDE 5 MG: 5 TABLET ORAL at 06:26

## 2024-02-28 RX ADMIN — ALUMINUM HYDROXIDE, MAGNESIUM HYDROXIDE, AND DIMETHICONE 15 ML: 400; 400; 40 SUSPENSION ORAL at 01:49

## 2024-02-28 RX ADMIN — MAGNESIUM OXIDE TAB 400 MG (241.3 MG ELEMENTAL MG) 400 MG: 400 (241.3 MG) TAB at 08:27

## 2024-02-28 RX ADMIN — ALUMINUM HYDROXIDE, MAGNESIUM HYDROXIDE, AND DIMETHICONE 15 ML: 400; 400; 40 SUSPENSION ORAL at 08:27

## 2024-02-28 RX ADMIN — HYDROXYCHLOROQUINE SULFATE 200 MG: 200 TABLET ORAL at 08:28

## 2024-02-28 RX ADMIN — FAMOTIDINE 20 MG: 20 TABLET, FILM COATED ORAL at 08:28

## 2024-02-28 RX ADMIN — DOCUSATE SODIUM 100 MG: 100 CAPSULE, LIQUID FILLED ORAL at 08:27

## 2024-02-28 RX ADMIN — DEXTROSE AND SODIUM CHLORIDE 75 ML/HR: 5; 450 INJECTION, SOLUTION INTRAVENOUS at 05:15

## 2024-02-28 RX ADMIN — CLONAZEPAM 0.5 MG: 0.5 TABLET ORAL at 09:11

## 2024-02-28 RX ADMIN — POTASSIUM CHLORIDE 40 MEQ: 1500 TABLET, EXTENDED RELEASE ORAL at 08:28

## 2024-02-28 RX ADMIN — BUPROPION HYDROCHLORIDE 300 MG: 150 TABLET, EXTENDED RELEASE ORAL at 06:21

## 2024-02-28 RX ADMIN — ACETAMINOPHEN 1000 MG: 500 TABLET ORAL at 01:49

## 2024-02-28 NOTE — PLAN OF CARE
Problem: Adult Inpatient Plan of Care  Goal: Plan of Care Review  Outcome: Met  Goal: Patient-Specific Goal (Individualized)  Outcome: Met  Goal: Absence of Hospital-Acquired Illness or Injury  Outcome: Met  Intervention: Identify and Manage Fall Risk  Recent Flowsheet Documentation  Taken 2/28/2024 0800 by Vanesa Lopez RN  Safety Promotion/Fall Prevention:   safety round/check completed   room organization consistent   muscle strengthening facilitated   fall prevention program maintained   clutter free environment maintained   assistive device/personal items within reach  Intervention: Prevent Skin Injury  Recent Flowsheet Documentation  Taken 2/28/2024 0800 by Vanesa Lopez RN  Body Position: position changed independently  Skin Protection: adhesive use limited  Intervention: Prevent and Manage VTE (Venous Thromboembolism) Risk  Recent Flowsheet Documentation  Taken 2/28/2024 0800 by Vanesa Lopez RN  Activity Management:   ambulated to bathroom   ambulated in room  VTE Prevention/Management: sequential compression devices off  Range of Motion: active ROM (range of motion) encouraged  Intervention: Prevent Infection  Recent Flowsheet Documentation  Taken 2/28/2024 0800 by Vanesa Lopez RN  Infection Prevention:   environmental surveillance performed   hand hygiene promoted   rest/sleep promoted   single patient room provided  Goal: Optimal Comfort and Wellbeing  Outcome: Met  Intervention: Provide Person-Centered Care  Recent Flowsheet Documentation  Taken 2/28/2024 0800 by Vanesa Lopez RN  Trust Relationship/Rapport:   care explained   choices provided  Goal: Readiness for Transition of Care  Outcome: Met     Problem: Fall Injury Risk  Goal: Absence of Fall and Fall-Related Injury  Outcome: Met  Intervention: Identify and Manage Contributors  Recent Flowsheet Documentation  Taken 2/28/2024 0800 by Vanesa Lopez RN  Medication Review/Management: medications reviewed  Intervention:  Promote Injury-Free Environment  Recent Flowsheet Documentation  Taken 2/28/2024 0800 by Vanesa Lopez RN  Safety Promotion/Fall Prevention:   safety round/check completed   room organization consistent   muscle strengthening facilitated   fall prevention program maintained   clutter free environment maintained   assistive device/personal items within reach     Problem: Bleeding (Hysterectomy)  Goal: Absence of Bleeding  Outcome: Met     Problem: Bowel Motility Impaired (Hysterectomy)  Goal: Effective Bowel Elimination  Outcome: Met     Problem: Fluid and Electrolyte Imbalance (Hysterectomy)  Goal: Fluid and Electrolyte Balance  Outcome: Met  Intervention: Monitor and Manage Fluid and Electrolyte Balance  Recent Flowsheet Documentation  Taken 2/28/2024 0800 by Vanesa Lopez RN  Fluid/Electrolyte Management: fluids provided     Problem: Infection (Hysterectomy)  Goal: Absence of Infection Signs and Symptoms  Outcome: Met     Problem: Ongoing Anesthesia Effects (Hysterectomy)  Goal: Anesthesia/Sedation Recovery  Outcome: Met  Intervention: Optimize Anesthesia Recovery  Recent Flowsheet Documentation  Taken 2/28/2024 0800 by Vanesa Lopez RN  Safety Promotion/Fall Prevention:   safety round/check completed   room organization consistent   muscle strengthening facilitated   fall prevention program maintained   clutter free environment maintained   assistive device/personal items within reach  Administration (IS): self-administered     Problem: Pain (Hysterectomy)  Goal: Acceptable Pain Control  Outcome: Met  Intervention: Prevent or Manage Pain  Recent Flowsheet Documentation  Taken 2/28/2024 0800 by Vanesa Lopez RN  Diversional Activities:   television   smartphone     Problem: Postoperative Nausea and Vomiting (Hysterectomy)  Goal: Nausea and Vomiting Relief  Outcome: Met     Problem: Postoperative Urinary Retention (Hysterectomy)  Goal: Effective Urinary Elimination  Outcome: Met     Problem:  Respiratory Compromise (Hysterectomy)  Goal: Effective Oxygenation and Ventilation  Outcome: Met  Intervention: Optimize Oxygenation and Ventilation  Recent Flowsheet Documentation  Taken 2/28/2024 0800 by Vanesa Lopez, RN  Head of Bed (HOB) Positioning: HOB elevated  Airway/Ventilation Management: airway patency maintained   Goal Outcome Evaluation:                              Patient appropriate for discharge per provider order

## 2024-02-28 NOTE — DISCHARGE SUMMARY
Discharge Summary      Date of Admission: 2/26/2024   Date of Discharge: 2/28/2024   Admitting Diagnosis: Abnormal uterine bleeding (AUB) [N93.9]  History of endometrial ablation [Z98.890]  Pelvic pain [R10.2]  S/P abdominal supracervical subtotal hysterectomy [Z90.711]  Uterine bleeding [N93.9]   Discharge Diagnosis: Same as above,  status post diagnostic laparoscopy, conversion to open supracervical hysterectomy, bilateral salpingectomy, significant lysis of adhesions, cystoscopy.    Procedures Performed: Procedure(s):  DIAGNOSTIC HYSTEROSCOPY  DIAGNOSTIC LAPAROSCOPY, CONVERSION TO OPEN SUPRACERVICAL HYSTERECTOMY, BILATERAL SALPINGECTOMY, SIGNIFICANT LYSIS OF ADHESIONS, CYSTOSCOPY      Consults: Consults       No orders found from 1/28/2024 to 2/27/2024.           Brief History: Patient is a 43 y.o. female who underwent the above-stated surgery 2/26/2024.   Hospital Course: On postop day 2 the patient was ambulating well.  She was tolerating diet.  She was voiding without difficulty and had passed gas.  Her pain was well-controlled.  She felt ready for discharge.        Pending Studies:      Condition at discharge: good   Discharge Medications:    Discharge Medications        New Medications        Instructions Start Date   Acetaminophen Extra Strength 500 MG tablet   1,000 mg, Oral, Every 8 Hours      docusate sodium 100 MG capsule  Commonly known as: COLACE   100 mg, Oral, 2 Times Daily      Gas Relief 80 MG chewable tablet  Generic drug: simethicone   80 mg, Oral, 4 Times Daily PRN      ibuprofen 800 MG tablet  Commonly known as: ADVIL,MOTRIN   800 mg, Oral, Every 8 Hours      ondansetron 4 MG tablet  Commonly known as: Zofran   4 mg, Oral, Every 8 Hours PRN      oxyCODONE 5 MG immediate release tablet  Commonly known as: Roxicodone   5 mg, Oral, Every 6 Hours PRN      potassium chloride 20 MEQ CR tablet  Commonly known as: K-DUR,KLOR-CON   20 mEq, Oral, 2 Times Daily             Continue These Medications         Instructions Start Date   amphetamine-dextroamphetamine 30 MG tablet  Commonly known as: ADDERALL   30 mg, Oral, Daily      buPROPion  MG 24 hr tablet  Commonly known as: WELLBUTRIN XL   300 mg, Oral, Every Morning      clonazePAM 0.5 MG tablet  Commonly known as: KlonoPIN   0.5 mg, Oral, 2 Times Daily PRN      hydroCHLOROthiazide 25 MG tablet   25 mg, Oral, Daily      hydroxychloroquine 200 MG tablet  Commonly known as: PLAQUENIL   200 mg, Oral, Daily      leflunomide 10 MG tablet  Commonly known as: ARAVA   10 mg, Oral, Daily      sertraline 50 MG tablet  Commonly known as: ZOLOFT   Take 1 tablet by mouth Daily.      Ubrelvy 50 MG tablet  Generic drug: ubrogepant   TAKE ONE TABLET AT ONSET OF HEADACHE. MAY REPEAT IN 2 HOURS IF NEEDED. MAX DOSE IN 24 HOURS              Discharge Disposition: Home or Self Care   Follow-up: No future appointments.       Time: Discharge 15 min    Follow up:RTO 1 week    This note has been electronically signed.    Zina Bell PA-C   February 28, 2024

## 2024-02-28 NOTE — PLAN OF CARE
Problem: Adult Inpatient Plan of Care  Goal: Plan of Care Review  Outcome: Ongoing, Progressing  Goal: Patient-Specific Goal (Individualized)  Outcome: Ongoing, Progressing  Goal: Absence of Hospital-Acquired Illness or Injury  Outcome: Ongoing, Progressing  Intervention: Identify and Manage Fall Risk  Recent Flowsheet Documentation  Taken 2/28/2024 0000 by Yuki Peterson Nursing Student  Safety Promotion/Fall Prevention:   fall prevention program maintained   assistive device/personal items within reach   nonskid shoes/slippers when out of bed   room organization consistent   safety round/check completed  Taken 2/27/2024 2235 by Yuki Peterson, Nursing Student  Safety Promotion/Fall Prevention:   assistive device/personal items within reach   clutter free environment maintained   fall prevention program maintained   nonskid shoes/slippers when out of bed   room organization consistent   safety round/check completed  Taken 2/27/2024 2045 by Yuki Peterson Nursing Student  Safety Promotion/Fall Prevention:   activity supervised   clutter free environment maintained   fall prevention program maintained   nonskid shoes/slippers when out of bed   room organization consistent   safety round/check completed  Intervention: Prevent Skin Injury  Recent Flowsheet Documentation  Taken 2/28/2024 0000 by Yuki Peterson, Nursing Student  Body Position:   position changed independently   supine  Taken 2/27/2024 2235 by Yuki Peterson Nursing Student  Body Position: position changed independently  Taken 2/27/2024 2045 by Yuki Peterson Nursing Student  Body Position: position changed independently  Skin Protection: adhesive use limited  Intervention: Prevent and Manage VTE (Venous Thromboembolism) Risk  Recent Flowsheet Documentation  Taken 2/27/2024 2045 by Yuki Peterson, Nursing Student  Range of Motion:   active ROM (range of motion) encouraged   ROM (range of motion) performed  Intervention:  Prevent Infection  Recent Flowsheet Documentation  Taken 2/28/2024 0000 by Yuki Peterson, Nursing Student  Infection Prevention:   environmental surveillance performed   hand hygiene promoted   rest/sleep promoted   single patient room provided  Taken 2/27/2024 2235 by Yuki Peterson, Nursing Student  Infection Prevention:   environmental surveillance performed   hand hygiene promoted   rest/sleep promoted   single patient room provided  Taken 2/27/2024 2045 by Yuki Peterson, Nursing Student  Infection Prevention:   environmental surveillance performed   single patient room provided   hand hygiene promoted  Goal: Optimal Comfort and Wellbeing  Outcome: Ongoing, Progressing  Intervention: Provide Person-Centered Care  Recent Flowsheet Documentation  Taken 2/27/2024 2045 by Yuki Peterson, Nursing Student  Trust Relationship/Rapport:   care explained   choices provided   questions answered   questions encouraged  Goal: Readiness for Transition of Care  Outcome: Ongoing, Progressing     Problem: Fall Injury Risk  Goal: Absence of Fall and Fall-Related Injury  Outcome: Ongoing, Progressing  Intervention: Identify and Manage Contributors  Recent Flowsheet Documentation  Taken 2/27/2024 2045 by Yuki Peterson, Nursing Student  Medication Review/Management: medications reviewed  Intervention: Promote Injury-Free Environment  Recent Flowsheet Documentation  Taken 2/28/2024 0000 by Yuki Peterson, Nursing Student  Safety Promotion/Fall Prevention:   fall prevention program maintained   assistive device/personal items within reach   nonskid shoes/slippers when out of bed   room organization consistent   safety round/check completed  Taken 2/27/2024 2235 by Yuki Peterson, Nursing Student  Safety Promotion/Fall Prevention:   assistive device/personal items within reach   clutter free environment maintained   fall prevention program maintained   nonskid shoes/slippers when out of bed   room  organization consistent   safety round/check completed  Taken 2/27/2024 2045 by Yuki Peterson, Nursing Student  Safety Promotion/Fall Prevention:   activity supervised   clutter free environment maintained   fall prevention program maintained   nonskid shoes/slippers when out of bed   room organization consistent   safety round/check completed     Problem: Bleeding (Hysterectomy)  Goal: Absence of Bleeding  Outcome: Ongoing, Progressing  Intervention: Monitor and Manage Bleeding  Recent Flowsheet Documentation  Taken 2/27/2024 2045 by Yuki Peterson, Nursing Student  Bleeding Management: dressing monitored     Problem: Bowel Motility Impaired (Hysterectomy)  Goal: Effective Bowel Elimination  Outcome: Ongoing, Progressing     Problem: Fluid and Electrolyte Imbalance (Hysterectomy)  Goal: Fluid and Electrolyte Balance  Outcome: Ongoing, Progressing     Problem: Infection (Hysterectomy)  Goal: Absence of Infection Signs and Symptoms  Outcome: Ongoing, Progressing  Intervention: Prevent or Manage Infection  Recent Flowsheet Documentation  Taken 2/27/2024 2045 by Yuki Peterson, Nursing Student  Infection Management: aseptic technique maintained     Problem: Ongoing Anesthesia Effects (Hysterectomy)  Goal: Anesthesia/Sedation Recovery  Outcome: Ongoing, Progressing  Intervention: Optimize Anesthesia Recovery  Recent Flowsheet Documentation  Taken 2/28/2024 0000 by Yuki Peterson, Nursing Student  Safety Promotion/Fall Prevention:   fall prevention program maintained   assistive device/personal items within reach   nonskid shoes/slippers when out of bed   room organization consistent   safety round/check completed  Taken 2/27/2024 2235 by Yuki Peterson, Nursing Student  Safety Promotion/Fall Prevention:   assistive device/personal items within reach   clutter free environment maintained   fall prevention program maintained   nonskid shoes/slippers when out of bed   room organization consistent    safety round/check completed  Taken 2/27/2024 2045 by Yuki Peterson, Nursing Student  Safety Promotion/Fall Prevention:   activity supervised   clutter free environment maintained   fall prevention program maintained   nonskid shoes/slippers when out of bed   room organization consistent   safety round/check completed     Problem: Pain (Hysterectomy)  Goal: Acceptable Pain Control  Outcome: Ongoing, Progressing  Intervention: Prevent or Manage Pain  Recent Flowsheet Documentation  Taken 2/27/2024 2045 by Yuki Peterson, Nursing Student  Diversional Activities:   smartphone   television     Problem: Postoperative Nausea and Vomiting (Hysterectomy)  Goal: Nausea and Vomiting Relief  Outcome: Ongoing, Progressing     Problem: Postoperative Urinary Retention (Hysterectomy)  Goal: Effective Urinary Elimination  Outcome: Ongoing, Progressing     Problem: Respiratory Compromise (Hysterectomy)  Goal: Effective Oxygenation and Ventilation  Outcome: Ongoing, Progressing  Intervention: Optimize Oxygenation and Ventilation  Recent Flowsheet Documentation  Taken 2/28/2024 0000 by Yuki Peterson Nursing Student  Head of Bed (HOB) Positioning: HOB elevated  Taken 2/27/2024 2235 by Yuki Peterson Nursing Student  Head of Bed (HOB) Positioning: HOB elevated  Taken 2/27/2024 2045 by Yuki Peterson, Nursing Student  Head of Bed (HOB) Positioning: HOB elevated   Goal Outcome Evaluation:  Plan of care reviewed with the client. Patient has rested intermittently throughout the night. Patient has reported pain but it has been managed with PRN medications see eMAR. Patient denies bowel movement but has had audible bowel sounds and has ambulated throughout the halls. Possible discharge home today. No significant events throughout the night.

## 2024-02-29 NOTE — OUTREACH NOTE
Prep Survey      Flowsheet Row Responses   [facility] facility patient discharged from? [facility]   Is LACE score < 7 ? No   Eligibility Readm Mgmt   Discharge diagnosis s/p Abdominal supracercial subtotal hysterectomy   Does the patient have one of the following disease processes/diagnoses(primary or secondary)? General Surgery   Does the patient have Home health ordered? No   Is there a DME ordered? No   Prep survey completed? Yes            CEDRIC COLEMAN - Registered Nurse

## 2024-02-29 NOTE — PAYOR COMM NOTE
"To:  [facility]  From: Candice Glaser RN  Phone: [phone]  Fax: [phone]  NPI: [id number]  TIN: [id number]  Member ID: [id number]  MRN: [id number]    Procedure: Diagnostic hysteroscopy   Diagnostic laparoscopy  Conversion to an open supracervical hysterectomy  Significant lysis of adhesions  Drainage of right ovarian cysts (2)  Bilateral salpingectomy  Cystoscopy      Walter Gibson (43 y.o. Female)       Date of Birth   1980    Social Security Number       Address   [address] [place], [place] [address]    Home Phone   [phone]    MRN   [id number]       Shinto   None    Marital Status                               Admission Date   2/26/24    Admission Type   Elective    Admitting Provider   Edgar Chavez MD    Attending Provider       Department, Room/Bed   [facility], W207/1       Discharge Date   2/28/2024    Discharge Disposition   Home or Self Care    Discharge Destination                                 Attending Provider: (none)   Allergies: Ciprofloxacin, Penicillins    Isolation: None   Infection: None   Code Status: Prior    Ht: 172.7 cm (68\")   Wt: 103 kg (227 lb)    Admission Cmt: None   Principal Problem: S/P abdominal supracervical subtotal hysterectomy [Z90.711]                   Active Insurance as of 2/26/2024       Primary Coverage       Payor Plan Insurance Group Employer/Plan Group    ANTH MEDICARE REPLACEMENT ANTHEM MEDICARE ADVANTAGE [id number]       Payor Plan Address Payor Plan Phone Number Payor Plan Fax Number Effective Dates    [address] [phone]  9/1/2022 - None Entered    [facility] [address]         Subscriber Name Subscriber Birth Date Member ID       WALTER GIBSON 1980 [id number]               Secondary Coverage       Payor Plan Insurance Group Employer/Plan Group    [place]        Payor Plan Address Payor Plan Phone Number Payor Plan Fax Number Effective Dates    [address] " [phone]  4/10/2022 - None Entered    [place] [address]         Subscriber Name Subscriber Birth Date Member ID       WALTER GIBSON 1980 [id number]                     Emergency Contacts        (Rel.) Home Phone Work Phone Mobile Phone    Elio Gibson (Spouse) -- -- [phone]                 Discharge Summary        Zina Bell PA-C at 02/28/24 1206       Attestation signed by Edgar Chavez MD at 02/28/24 2561    I agree with the assessment and plan.    Edgar Chavez MD  Obstetrics and Gynecology  [facility]                    Discharge Summary      Date of Admission: 2/26/2024   Date of Discharge: 2/28/2024   Admitting Diagnosis: Abnormal uterine bleeding (AUB) [N93.9]  History of endometrial ablation [Z98.890]  Pelvic pain [R10.2]  S/P abdominal supracervical subtotal hysterectomy [Z90.711]  Uterine bleeding [N93.9]   Discharge Diagnosis: Same as above,  status post diagnostic laparoscopy, conversion to open supracervical hysterectomy, bilateral salpingectomy, significant lysis of adhesions, cystoscopy.    Procedures Performed: Procedure(s):  DIAGNOSTIC HYSTEROSCOPY  DIAGNOSTIC LAPAROSCOPY, CONVERSION TO OPEN SUPRACERVICAL HYSTERECTOMY, BILATERAL SALPINGECTOMY, SIGNIFICANT LYSIS OF ADHESIONS, CYSTOSCOPY      Consults: Consults       No orders found from 1/28/2024 to 2/27/2024.           Brief History: Patient is a 43 y.o. female who underwent the above-stated surgery 2/26/2024.   Hospital Course: On postop day 2 the patient was ambulating well.  She was tolerating diet.  She was voiding without difficulty and had passed gas.  Her pain was well-controlled.  She felt ready for discharge.        Pending Studies:      Condition at discharge: good   Discharge Medications:    Discharge Medications        New Medications        Instructions Start Date   Acetaminophen Extra Strength 500 MG tablet   1,000 mg, Oral, Every 8 Hours      docusate sodium 100 MG  capsule  Commonly known as: COLACE   100 mg, Oral, 2 Times Daily      Gas Relief 80 MG chewable tablet  Generic drug: simethicone   80 mg, Oral, 4 Times Daily PRN      ibuprofen 800 MG tablet  Commonly known as: ADVIL,MOTRIN   800 mg, Oral, Every 8 Hours      ondansetron 4 MG tablet  Commonly known as: Zofran   4 mg, Oral, Every 8 Hours PRN      oxyCODONE 5 MG immediate release tablet  Commonly known as: Roxicodone   5 mg, Oral, Every 6 Hours PRN      potassium chloride 20 MEQ CR tablet  Commonly known as: K-DUR,KLOR-CON   20 mEq, Oral, 2 Times Daily             Continue These Medications        Instructions Start Date   amphetamine-dextroamphetamine 30 MG tablet  Commonly known as: ADDERALL   30 mg, Oral, Daily      buPROPion  MG 24 hr tablet  Commonly known as: WELLBUTRIN XL   300 mg, Oral, Every Morning      clonazePAM 0.5 MG tablet  Commonly known as: KlonoPIN   0.5 mg, Oral, 2 Times Daily PRN      hydroCHLOROthiazide 25 MG tablet   25 mg, Oral, Daily      hydroxychloroquine 200 MG tablet  Commonly known as: PLAQUENIL   200 mg, Oral, Daily      leflunomide 10 MG tablet  Commonly known as: ARAVA   10 mg, Oral, Daily      sertraline 50 MG tablet  Commonly known as: ZOLOFT   Take 1 tablet by mouth Daily.      Ubrelvy 50 MG tablet  Generic drug: ubrogepant   TAKE ONE TABLET AT ONSET OF HEADACHE. MAY REPEAT IN 2 HOURS IF NEEDED. MAX DOSE IN 24 HOURS              Discharge Disposition: Home or Self Care   Follow-up: No future appointments.       Time: Discharge 15 min    Follow up:RTO 1 week    This note has been electronically signed.    Zina Bell PA-C   February 28, 2024    Electronically signed by Edgar Chavez MD at 02/28/24 7021

## 2024-03-05 ENCOUNTER — READMISSION MANAGEMENT (OUTPATIENT)
Dept: CALL CENTER | Facility: HOSPITAL | Age: 44
End: 2024-03-05
Payer: MEDICARE

## 2024-03-05 NOTE — OUTREACH NOTE
General Surgery Week 1 Survey      Flowsheet Row Responses   [facility] patient discharged from? [facility]   Does the patient have one of the following disease processes/diagnoses(primary or secondary)? General Surgery   Week 1 attempt successful? Yes   Call start time 1422   Call end time 1435   Meds reviewed with patient/caregiver? Yes   Is the patient having any side effects they believe may be caused by any medication additions or changes? No   Does the patient have all medications related to this admission filled (includes all antibiotics, pain medications, etc.) Yes   Is the patient taking all medications as directed (includes completed medication regime)? Yes   Medication comments States her PCP ordered pain medication, and taking as directed.   Does the patient have a follow up appointment scheduled with their surgeon? Yes   Has the patient kept scheduled appointments due by today? N/A   Comments Surgeon appt 03/06/24. Patient works with her PCP, and has been in contact.   Has home health visited the patient within 72 hours of discharge? N/A   Psychosocial issues? 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   Did the patient receive a copy of their discharge instructions? Yes   Nursing interventions Reviewed instructions with patient   What is the patient's perception of their health status since discharge? New symptoms unrelated to diagnosis   Nursing interventions Nurse provided patient education, Advised patient to call provider   Is the patient /caregiver able to teach back basic post-op care? Lifting as instructed by MD in discharge instructions, Do not remove steri-strips, Keep incision areas clean,dry and protected, No tub bath, swimming, or hot tub until instructed by MD, Take showers only when approved by MD-sponge bathe until then, Drive as instructed by MD in discharge instructions, Practice 'cough and deep breath'   Is the patient/caregiver able to teach back signs  and symptoms of incisional infection? Increased redness, swelling or pain at the incisonal site, Increased drainage or bleeding, Incisional warmth, Pus or odor from incision, Fever   Is the patient/caregiver able to teach back steps to recovery at home? Set small, achievable goals for return to baseline health, Rest and rebuild strength, gradually increase activity, Practice good oral hygiene, Eat a well-balance diet   Is the patient/caregiver able to teach back the hierarchy of who to call/visit for symptoms/problems? PCP, Specialist, Home health nurse, Urgent Care, ED, 911 Yes   Week 1 call completed? Yes   Is the patient interested in additional calls from an ambulatory ? No   Would this patient benefit from a Referral to [facility] Social Work? No   Wrap up additional comments Patient  has had some left lower abdominal pain for past 2 days, and talked with her PCP.  has also had some  nausea/dizziness today, and is unsure if related to pain medication prescribed by PCP.  is also pale. Encouraged to notify surgeon for evaluation-voiced understanding.  has good bowel function. Reports urinates without difficulty, but has discomfort/pressure after urinating.  will f/u with surgeon. Denies any needs today.   Call end time [id number]            Jennifer SUAREZ - Registered Nurse

## 2024-03-06 ENCOUNTER — LAB (OUTPATIENT)
Dept: LAB | Facility: HOSPITAL | Age: 44
End: 2024-03-06
Payer: MEDICARE

## 2024-03-06 ENCOUNTER — OFFICE VISIT (OUTPATIENT)
Dept: OBSTETRICS AND GYNECOLOGY | Facility: CLINIC | Age: 44
End: 2024-03-06
Payer: MEDICARE

## 2024-03-06 VITALS
WEIGHT: 227 LBS | BODY MASS INDEX: 34.4 KG/M2 | DIASTOLIC BLOOD PRESSURE: 82 MMHG | HEIGHT: 68 IN | SYSTOLIC BLOOD PRESSURE: 126 MMHG

## 2024-03-06 DIAGNOSIS — Z90.711 S/P ABDOMINAL SUPRACERVICAL SUBTOTAL HYSTERECTOMY: Primary | ICD-10-CM

## 2024-03-06 DIAGNOSIS — D62 ACUTE ON CHRONIC BLOOD LOSS ANEMIA: ICD-10-CM

## 2024-03-06 DIAGNOSIS — R53.83 OTHER FATIGUE: ICD-10-CM

## 2024-03-06 DIAGNOSIS — R30.0 DYSURIA: ICD-10-CM

## 2024-03-06 DIAGNOSIS — G89.18 POSTOPERATIVE PAIN: ICD-10-CM

## 2024-03-06 LAB
ALBUMIN SERPL-MCNC: 4 G/DL (ref 3.5–5.2)
ALBUMIN/GLOB SERPL: 1.2 G/DL
ALP SERPL-CCNC: 113 U/L (ref 39–117)
ALT SERPL W P-5'-P-CCNC: 19 U/L (ref 1–33)
ANION GAP SERPL CALCULATED.3IONS-SCNC: 9.6 MMOL/L (ref 5–15)
AST SERPL-CCNC: 19 U/L (ref 1–32)
BASOPHILS # BLD AUTO: 0.06 10*3/MM3 (ref 0–0.2)
BASOPHILS NFR BLD AUTO: 0.5 % (ref 0–1.5)
BILIRUB SERPL-MCNC: 0.5 MG/DL (ref 0–1.2)
BILIRUB UR QL STRIP: NEGATIVE
BUN SERPL-MCNC: 10 MG/DL (ref 6–20)
BUN/CREAT SERPL: 14.9 (ref 7–25)
CALCIUM SPEC-SCNC: 9.1 MG/DL (ref 8.6–10.5)
CHLORIDE SERPL-SCNC: 95 MMOL/L (ref 98–107)
CLARITY UR: CLEAR
CO2 SERPL-SCNC: 30.4 MMOL/L (ref 22–29)
COLOR UR: YELLOW
CREAT SERPL-MCNC: 0.67 MG/DL (ref 0.57–1)
DEPRECATED RDW RBC AUTO: 42.2 FL (ref 37–54)
EGFRCR SERPLBLD CKD-EPI 2021: 110.7 ML/MIN/1.73
EOSINOPHIL # BLD AUTO: 0.38 10*3/MM3 (ref 0–0.4)
EOSINOPHIL NFR BLD AUTO: 2.9 % (ref 0.3–6.2)
ERYTHROCYTE [DISTWIDTH] IN BLOOD BY AUTOMATED COUNT: 13.9 % (ref 12.3–15.4)
GLOBULIN UR ELPH-MCNC: 3.4 GM/DL
GLUCOSE SERPL-MCNC: 115 MG/DL (ref 65–99)
GLUCOSE UR STRIP-MCNC: NEGATIVE MG/DL
HCT VFR BLD AUTO: 35.7 % (ref 34–46.6)
HGB BLD-MCNC: 12.4 G/DL (ref 12–15.9)
HGB UR QL STRIP.AUTO: NEGATIVE
IMM GRANULOCYTES # BLD AUTO: 0.34 10*3/MM3 (ref 0–0.05)
IMM GRANULOCYTES NFR BLD AUTO: 2.6 % (ref 0–0.5)
KETONES UR QL STRIP: NEGATIVE
LEUKOCYTE ESTERASE UR QL STRIP.AUTO: NEGATIVE
LYMPHOCYTES # BLD AUTO: 1.56 10*3/MM3 (ref 0.7–3.1)
LYMPHOCYTES NFR BLD AUTO: 11.8 % (ref 19.6–45.3)
MCH RBC QN AUTO: 29.8 PG (ref 26.6–33)
MCHC RBC AUTO-ENTMCNC: 34.7 G/DL (ref 31.5–35.7)
MCV RBC AUTO: 85.8 FL (ref 79–97)
MONOCYTES # BLD AUTO: 1.13 10*3/MM3 (ref 0.1–0.9)
MONOCYTES NFR BLD AUTO: 8.5 % (ref 5–12)
NEUTROPHILS NFR BLD AUTO: 73.7 % (ref 42.7–76)
NEUTROPHILS NFR BLD AUTO: 9.8 10*3/MM3 (ref 1.7–7)
NITRITE UR QL STRIP: NEGATIVE
NRBC BLD AUTO-RTO: 0 /100 WBC (ref 0–0.2)
PH UR STRIP.AUTO: 5.5 [PH] (ref 5–8)
PLATELET # BLD AUTO: 336 10*3/MM3 (ref 140–450)
PMV BLD AUTO: 8.8 FL (ref 6–12)
POTASSIUM SERPL-SCNC: 4 MMOL/L (ref 3.5–5.2)
PROT SERPL-MCNC: 7.4 G/DL (ref 6–8.5)
PROT UR QL STRIP: NEGATIVE
RBC # BLD AUTO: 4.16 10*6/MM3 (ref 3.77–5.28)
SODIUM SERPL-SCNC: 135 MMOL/L (ref 136–145)
SP GR UR STRIP: 1.02 (ref 1–1.03)
UROBILINOGEN UR QL STRIP: NORMAL
WBC NRBC COR # BLD AUTO: 13.27 10*3/MM3 (ref 3.4–10.8)

## 2024-03-06 PROCEDURE — 81003 URINALYSIS AUTO W/O SCOPE: CPT

## 2024-03-06 PROCEDURE — 99024 POSTOP FOLLOW-UP VISIT: CPT | Performed by: OBSTETRICS & GYNECOLOGY

## 2024-03-06 PROCEDURE — 85025 COMPLETE CBC W/AUTO DIFF WBC: CPT

## 2024-03-06 PROCEDURE — 36415 COLL VENOUS BLD VENIPUNCTURE: CPT

## 2024-03-06 PROCEDURE — 80053 COMPREHEN METABOLIC PANEL: CPT

## 2024-03-06 NOTE — PROGRESS NOTES
"Postoperative Assessment Visit    Subjective   Chief Complaint   Patient presents with    Post-op     9 days post op dx laparoscopy, supracervical hysterectomy, BS, DEMETRIO, cystoscopy. Left side pain, fatigue, weak.       43 y.o.  female who presents for a post-op visit.    Pre-op Diagnosis:  Abnormal uterine bleeding  Menorrhagia with irregular cycle  Pelvic pain  Previous endometrial ablation and tubal ligation  Uterine fibroids  Enlarged uterus   Post-op Diagnosis:  Same  Significant pelvic adhesive disease   Procedure: Diagnostic hysteroscopy   Diagnostic laparoscopy  Conversion to an open supracervical hysterectomy  Significant lysis of adhesions  Drainage of right ovarian cysts (2)  Bilateral salpingectomy  Cystoscopy      The patient is meeting all postoperative milestones, but she notes left lower quadrant pain and fatigue.  Minimal to no vaginal bleeding.  No fevers.  She does have some dysuria.     Objective   Vitals:    24 1325   BP: 126/82   Weight: 103 kg (227 lb)   Height: 172.7 cm (68\")     Physical Exam:  Incision(s): Well-healing, no signs of infection or separation  Reassuring postoperative exam       Medical Decision Making:    I have reviewed the operative note.  I have reviewed the postoperative labs where appropriate.    Assessment   Abdominal supracervical hysterectomy  Post-op evaluation  Anemia  Dysuria  Postoperative pain     Plan    Orders Placed This Encounter   Procedures    Comprehensive Metabolic Panel     Standing Status:   Future     Standing Expiration Date:   3/6/2025     Order Specific Question:   Release to patient     Answer:   Routine Release [[id number]]    Urinalysis With Culture If Indicated -     Standing Status:   Future     Standing Expiration Date:   3/6/2025     Order Specific Question:   Release to patient     Answer:   Routine Release [[id number]]    CBC & Differential     Standing Status:   Future     Standing Expiration Date:   3/6/2025     Order Specific " Question:   Manual Differential     Answer:   No     Order Specific Question:   Release to patient     Answer:   Routine Release [[id number]]       Medication Management: none    Patient is meeting all post-op milestones.  Pathology reviewed - benign tissue  Surgical findings discussed.  Postoperative precautions and restrictions reviewed.  Labs ordered today.  We will follow-up results by phone.    RTC for full postoperative exam in 5 weeks.    Edgar Chavez MD  Obstetrics and Gynecology  [facility]

## 2024-03-21 ENCOUNTER — READMISSION MANAGEMENT (OUTPATIENT)
Dept: CALL CENTER | Facility: HOSPITAL | Age: 44
End: 2024-03-21
Payer: MEDICARE

## 2024-03-21 NOTE — OUTREACH NOTE
General Surgery Week 3 Survey      Flowsheet Row Responses   [facility] patient discharged from? [facility]   Does the patient have one of the following disease processes/diagnoses(primary or secondary)? General Surgery   Week 3 attempt successful? Yes   Call end time 1224   Discharge diagnosis s/p Abdominal supracercial subtotal hysterectomy   Person spoke with today (if not patient) and relationship patient   Meds reviewed with patient/caregiver? Yes   Does the patient have all medications related to this admission filled (includes all antibiotics, pain medications, etc.) Yes   Prescription comments Medications for anxiety and depression not helping now-   Is the patient taking all medications as directed (includes completed medication regime)? Yes   Does the patient have a follow up appointment scheduled with their surgeon? Yes   Comments has seen surgeon since DC. Advised to speak with pcp/ OB- To discuss medication needs.   Has home health visited the patient within 72 hours of discharge? N/A   Psychosocial issues? No   Did the patient receive a copy of their discharge instructions? Yes   Nursing interventions Reviewed instructions with patient   What is the patient's perception of their health status since discharge? New symptoms unrelated to diagnosis   Nursing interventions Nurse provided patient education   Is the patient/caregiver able to teach back the hierarchy of who to call/visit for symptoms/problems? PCP, Specialist, Home health nurse, Urgent Care, ED, 911 Yes   Week 3 call completed? Yes   Graduated Yes   Wrap up additional comments patient feels like she is doing better. however anxiety and depression medication was controlled prior to surgery after surgery patient feels like her hormones are off balance- pt had a subtotal hysterectomy. Educated the importance of medication follow up with pcp or with OBGYN who can help if any hormone off balance. Patient understands and will reach out.   Call  end time 1990            Alisa SUAREZ - Registered Nurse

## 2024-04-11 ENCOUNTER — OFFICE VISIT (OUTPATIENT)
Dept: OBSTETRICS AND GYNECOLOGY | Facility: CLINIC | Age: 44
End: 2024-04-11
Payer: MEDICARE

## 2024-04-11 VITALS
HEIGHT: 68 IN | DIASTOLIC BLOOD PRESSURE: 80 MMHG | SYSTOLIC BLOOD PRESSURE: 122 MMHG | BODY MASS INDEX: 34.28 KG/M2 | WEIGHT: 226.2 LBS

## 2024-04-11 DIAGNOSIS — Z90.711 S/P ABDOMINAL SUPRACERVICAL SUBTOTAL HYSTERECTOMY: ICD-10-CM

## 2024-04-11 DIAGNOSIS — Z09 POSTOPERATIVE FOLLOW-UP: Primary | ICD-10-CM

## 2024-04-11 PROCEDURE — 99024 POSTOP FOLLOW-UP VISIT: CPT | Performed by: PHYSICIAN ASSISTANT

## 2024-04-11 RX ORDER — FUROSEMIDE 20 MG/1
TABLET ORAL
COMMUNITY
Start: 2024-04-06

## 2024-04-11 RX ORDER — TRAMADOL HYDROCHLORIDE 50 MG/1
50 TABLET ORAL
COMMUNITY
Start: 2024-04-09

## 2024-04-11 RX ORDER — CYCLOBENZAPRINE HCL 10 MG
TABLET ORAL
COMMUNITY
Start: 2024-03-29

## 2024-04-11 RX ORDER — DICLOFENAC SODIUM 75 MG/1
75 TABLET, DELAYED RELEASE ORAL
COMMUNITY
Start: 2024-04-04 | End: 2024-04-18

## 2024-04-11 NOTE — PROGRESS NOTES
Subjective   Chief Complaint   Patient presents with    Post-op     6 weeks post-op Dx lap, converted to open supracervical hysterectomy, lysis of adhesions, cysto, doing well       Pao Modi is a 43 y.o. year old  presenting to be seen for post op check.  Has done well post surgery per Dr. Chavez.   Normal bowel and bladder function  She is still feeling somewhat fatigued but improving  Has had a little more issues with panic attacks post surgery but is taking prescribed medications for panic attacks. She feels likely due to not being back to normal activity yet        Past Medical History:   Diagnosis Date    Abnormal Pap smear of cervix     ACL injury tear     left knee    ADHD (attention deficit hyperactivity disorder)     Cervical dysplasia     Depression     Fluid retention     left leg and knee    Gallbladder cancer     found incidentally at the time of cholecystectomy.  Treated with chemo x 3 months prior to undergoing definitive liver resection with hepaticojejunoscopy and lymphadenectomy. treated at .     History of fracture     AS A CHILD RIGHT WRIST, RIGHT FOOT    History of transfusion     HPV (human papilloma virus) infection     Hx antineoplastic chemo     Hyperlipidemia     Migraines     Multiple gestation     Panic disorder     Tattoo     Torn meniscus     left        Current Outpatient Medications:     acetaminophen (TYLENOL) 500 MG tablet, Take 2 tablets by mouth Every 8 (Eight) Hours., Disp: 60 tablet, Rfl: 0    amphetamine-dextroamphetamine (ADDERALL) 30 MG tablet, Take 1 tablet by mouth Daily., Disp: , Rfl:     buPROPion XL (WELLBUTRIN XL) 300 MG 24 hr tablet, Take 1 tablet by mouth Every Morning., Disp: , Rfl: 0    clonazePAM (KlonoPIN) 0.5 MG tablet, Take 1 tablet by mouth 2 (Two) Times a Day As Needed for Anxiety., Disp: , Rfl:     cyclobenzaprine (FLEXERIL) 10 MG tablet, , Disp: , Rfl:     diclofenac (VOLTAREN) 75 MG EC tablet, Take 1 tablet by mouth., Disp: , Rfl:      docusate sodium 100 MG capsule, Take 1 capsule by mouth 2 (Two) Times a Day., Disp: 60 capsule, Rfl: 1    furosemide (LASIX) 20 MG tablet, , Disp: , Rfl:     hydroCHLOROthiazide 25 MG tablet, Take 1 tablet by mouth Daily., Disp: , Rfl:     hydroxychloroquine (PLAQUENIL) 200 MG tablet, Take 1 tablet by mouth Daily., Disp: , Rfl:     ibuprofen (ADVIL,MOTRIN) 800 MG tablet, Take 1 tablet by mouth Every 8 (Eight) Hours., Disp: 30 tablet, Rfl: 0    leflunomide (ARAVA) 10 MG tablet, Take 1 tablet by mouth Daily., Disp: , Rfl:     ondansetron (Zofran) 4 MG tablet, Take 1 tablet by mouth Every 8 (Eight) Hours As Needed for Nausea or Vomiting., Disp: 20 tablet, Rfl: 2    oxyCODONE (Roxicodone) 5 MG immediate release tablet, Take 1 tablet by mouth Every 6 (Six) Hours As Needed for Severe Pain., Disp: 10 tablet, Rfl: 0    potassium chloride (K-DUR,KLOR-CON) 20 MEQ CR tablet, Take 1 tablet by mouth 2 (Two) Times a Day., Disp: 10 tablet, Rfl: 0    sertraline (ZOLOFT) 50 MG tablet, Take 1 tablet by mouth Daily., Disp: , Rfl:     simethicone (MYLICON) 80 MG chewable tablet, Chew 1 tablet 4 (Four) Times a Day As Needed for Flatulence., Disp: 30 tablet, Rfl: 0    traMADol (ULTRAM) 50 MG tablet, Take 1 tablet by mouth., Disp: , Rfl:     ubrogepant tablet, TAKE ONE TABLET AT ONSET OF HEADACHE. MAY REPEAT IN 2 HOURS IF NEEDED. MAX DOSE IN 24 HOURS, Disp: , Rfl:    Allergies   Allergen Reactions    Ciprofloxacin Rash    Penicillins Rash      Past Surgical History:   Procedure Laterality Date     SECTION      X1    CHOLECYSTECTOMY WITH COMMON BILE DUCT EXPLORATION  10/2014    COLONOSCOPY      COLONOSCOPY N/A 2018    Procedure: COLONOSCOPY;  Surgeon: Liz Carcamo MD;  Location: [facility] ENDOSCOPY;  Service: Gastroenterology    COLONOSCOPY N/A 10/16/2023    Procedure: COLONOSCOPY;  Surgeon: Liz Carcamo MD;  Location: [facility] ENDOSCOPY;  Service: Gastroenterology;  Laterality: N/A;    D & C HYSTEROSCOPY  ENDOMETRIAL ABLATION      ENDOSCOPY      ERCP      WITH STENT PLACEMENT FOR GALL STONE, PRIOR TO LAP CHOLEY AND DIAGNOSIS OF GB CANCER    HYSTEROSCOPY N/A 2024    Procedure: DIAGNOSTIC HYSTEROSCOPY;  Surgeon: Edgar Chavez MD;  Location: [facility] OR;  Service: Obstetrics/Gynecology;  Laterality: N/A;    LIVER RESECTION      for GB cancer, has hepaticojejunostomy    PORTACATH PLACEMENT      TOTAL LAPAROSCOPIC HYSTERECTOMY N/A 2024    Procedure: DIAGNOSTIC LAPAROSCOPY, CONVERSION TO OPEN SUPRACERVICAL HYSTERECTOMY, BILATERAL SALPINGECTOMY, SIGNIFICANT LYSIS OF ADHESIONS, CYSTOSCOPY;  Surgeon: Edgar Chavez MD;  Location: [facility] OR;  Service: Obstetrics/Gynecology;  Laterality: N/A;    TUBAL ABDOMINAL LIGATION      VENOUS ACCESS DEVICE (PORT) REMOVAL      WISDOM TOOTH EXTRACTION        Social History     Socioeconomic History    Marital status:    Tobacco Use    Smoking status: Former     Current packs/day: 0.00     Average packs/day: 0.5 packs/day for 10.0 years (5.0 ttl pk-yrs)     Types: Cigarettes     Start date:      Quit date:      Years since quittin.2    Smokeless tobacco: Never    Tobacco comments:     TRANSITIONED TO [employer] APPROXIMATELY 10 YEARS AGO.   Vaping Use    Vaping status: Every Day    Substances: Nicotine   Substance and Sexual Activity    Alcohol use: No    Drug use: No      Family History   Problem Relation Age of Onset    Breast cancer Sister         DCIS    Heart failure Mother          at 74    Diabetes Mother     Hypertension Mother     Heart failure Father         treated fro SCC anal canal.  at 80.     Prostate cancer Father     Diabetes Father        Review of Systems   Constitutional:  Negative for chills, diaphoresis and fever.   Gastrointestinal:  Negative for constipation, diarrhea, nausea and vomiting.   Genitourinary:  Negative for difficulty urinating, dysuria, pelvic pain, vaginal bleeding and vaginal discharge.           Objective  "  /80   Ht 172.7 cm (68\")   Wt 103 kg (226 lb 3.2 oz)   LMP 02/15/2024   BMI 34.39 kg/m²     Physical Exam  Constitutional:       Appearance: Normal appearance. She is well-developed and well-groomed.   Eyes:      General: Lids are normal.      Extraocular Movements: Extraocular movements intact.      Conjunctiva/sclera: Conjunctivae normal.   Abdominal:      General: There is no distension.      Palpations: Abdomen is soft.      Tenderness: There is no abdominal tenderness.   Genitourinary:     Labia:         Right: No rash, tenderness or lesion.         Left: No rash, tenderness or lesion.       Urethra: No prolapse, urethral pain, urethral swelling or urethral lesion.      Vagina: No vaginal discharge, tenderness, bleeding or lesions.      Cervix: No cervical motion tenderness, discharge, friability, lesion, erythema or cervical bleeding.      Uterus: Absent.       Adnexa:         Right: No mass or tenderness.          Left: No mass or tenderness.     Neurological:      Mental Status: She is alert.   Psychiatric:         Attention and Perception: Attention normal.         Mood and Affect: Mood normal.         Speech: Speech normal.         Behavior: Behavior is cooperative.            Result Review :                   Assessment and Plan  Diagnoses and all orders for this visit:    1. Postoperative follow-up (Primary)    2. S/P abdominal supracervical subtotal hysterectomy      Patient Instructions   May return to work Monday  Pelvic rest 2 more weeks  RTO September for pap/annual           This note was electronically signed.    Zina Bell PA-C   April 11, 2024  "

## 2024-04-27 ENCOUNTER — HOSPITAL ENCOUNTER (EMERGENCY)
Facility: HOSPITAL | Age: 44
Discharge: HOME | End: 2024-04-27
Attending: EMERGENCY MEDICINE
Payer: MEDICARE

## 2024-04-27 VITALS
SYSTOLIC BLOOD PRESSURE: 159 MMHG | DIASTOLIC BLOOD PRESSURE: 110 MMHG | RESPIRATION RATE: 18 BRPM | HEIGHT: 68 IN | TEMPERATURE: 98 F | HEART RATE: 84 BPM | WEIGHT: 236.2 LBS | OXYGEN SATURATION: 100 % | BODY MASS INDEX: 35.8 KG/M2

## 2024-04-27 DIAGNOSIS — M54.10 RADICULAR LOW BACK PAIN: Primary | ICD-10-CM

## 2024-04-27 LAB — B-HCG UR QL: NEGATIVE

## 2024-04-27 PROCEDURE — 81025 URINE PREGNANCY TEST: CPT | Performed by: EMERGENCY MEDICINE

## 2024-04-27 PROCEDURE — 99283 EMERGENCY DEPT VISIT LOW MDM: CPT

## 2024-04-27 PROCEDURE — 96372 THER/PROPH/DIAG INJ SC/IM: CPT

## 2024-04-27 PROCEDURE — 25010000002 KETOROLAC TROMETHAMINE PER 15 MG: Performed by: EMERGENCY MEDICINE

## 2024-04-27 PROCEDURE — 25010000002 ORPHENADRINE CITRATE PER 60 MG: Performed by: EMERGENCY MEDICINE

## 2024-04-27 RX ORDER — ORPHENADRINE CITRATE 30 MG/ML
60 INJECTION INTRAMUSCULAR; INTRAVENOUS ONCE
Status: COMPLETED | OUTPATIENT
Start: 2024-04-27 | End: 2024-04-27

## 2024-04-27 RX ORDER — KETOROLAC TROMETHAMINE 30 MG/ML
60 INJECTION, SOLUTION INTRAMUSCULAR; INTRAVENOUS ONCE
Status: COMPLETED | OUTPATIENT
Start: 2024-04-27 | End: 2024-04-27

## 2024-04-27 RX ADMIN — ORPHENADRINE CITRATE 60 MG: 60 INJECTION INTRAMUSCULAR; INTRAVENOUS at 05:43

## 2024-04-27 RX ADMIN — KETOROLAC TROMETHAMINE 60 MG: 60 INJECTION, SOLUTION INTRAMUSCULAR at 05:43

## 2024-04-27 NOTE — DISCHARGE INSTRUCTIONS
Your blood pressure was noted to be elevated during your emergency department visit.  You should follow-up closely with primary care regarding this.  You also need to discuss your low back pain with your primary care provider.  You should return to the emerged department for any concerning symptoms or new concerns.

## 2024-04-27 NOTE — ED PROVIDER NOTES
EMERGENCY DEPARTMENT ENCOUNTER    Pt Name: Pao Modi  MRN: [id number]  Pt :   1980  Room Number:    Date of encounter:  2024  PCP: Zina Wu APRN  ED Provider: Rob Connolly MD    Historian: Patient      HPI:  Chief Complaint: Back pain        Context: Pao Modi is a 43 y.o. female who presents to the ED c/o back pain.  Patient reports to me that around midnight she began having low back pain that radiates to the left, down her posterior left buttock and to her left knee.  She denies having urinary retention, urinary continence, fecal incontinence or bilateral lower extremity weakness or numbness.  She denies any change in sensation after wiping when using the restroom.  She denies having any fever.  She denies any acute injury.      PAST MEDICAL HISTORY  Past Medical History:   Diagnosis Date    Abnormal Pap smear of cervix     ACL injury tear     left knee    ADHD (attention deficit hyperactivity disorder)     Cervical dysplasia     Depression     Fluid retention     left leg and knee    Gallbladder cancer     found incidentally at the time of cholecystectomy.  Treated with chemo x 3 months prior to undergoing definitive liver resection with hepaticojejunoscopy and lymphadenectomy. treated at .     History of fracture     AS A CHILD RIGHT WRIST, RIGHT FOOT    History of transfusion     HPV (human papilloma virus) infection     Hx antineoplastic chemo     Hyperlipidemia     Migraines     Multiple gestation     Panic disorder     Tattoo     Torn meniscus     left         PAST SURGICAL HISTORY  Past Surgical History:   Procedure Laterality Date     SECTION      X1    CHOLECYSTECTOMY WITH COMMON BILE DUCT EXPLORATION  10/2014    COLONOSCOPY      COLONOSCOPY N/A 2018    Procedure: COLONOSCOPY;  Surgeon: Liz Carcamo MD;  Location: [facility] ENDOSCOPY;  Service: Gastroenterology    COLONOSCOPY N/A 10/16/2023    Procedure: COLONOSCOPY;  Surgeon: Liz Carcamo  MD JEFFERSON;  Location: [facility] ENDOSCOPY;  Service: Gastroenterology;  Laterality: N/A;    D & C HYSTEROSCOPY ENDOMETRIAL ABLATION      ENDOSCOPY      ERCP      WITH STENT PLACEMENT FOR GALL STONE, PRIOR TO LAP CHOLEY AND DIAGNOSIS OF GB CANCER    HYSTEROSCOPY N/A 2024    Procedure: DIAGNOSTIC HYSTEROSCOPY;  Surgeon: Edgar Chavez MD;  Location: [facility] OR;  Service: Obstetrics/Gynecology;  Laterality: N/A;    LIVER RESECTION      for GB cancer, has hepaticojejunostomy    PORTACATH PLACEMENT      TOTAL LAPAROSCOPIC HYSTERECTOMY N/A 2024    Procedure: DIAGNOSTIC LAPAROSCOPY, CONVERSION TO OPEN SUPRACERVICAL HYSTERECTOMY, BILATERAL SALPINGECTOMY, SIGNIFICANT LYSIS OF ADHESIONS, CYSTOSCOPY;  Surgeon: Edgar Chavez MD;  Location: [facility] OR;  Service: Obstetrics/Gynecology;  Laterality: N/A;    TUBAL ABDOMINAL LIGATION      VENOUS ACCESS DEVICE (PORT) REMOVAL      WISDOM TOOTH EXTRACTION           FAMILY HISTORY  Family History   Problem Relation Age of Onset    Breast cancer Sister         DCIS    Heart failure Mother          at 74    Diabetes Mother     Hypertension Mother     Heart failure Father         treated fro SCC anal canal.  at 80.     Prostate cancer Father     Diabetes Father          SOCIAL HISTORY  Social History     Socioeconomic History    Marital status:    Tobacco Use    Smoking status: Former     Current packs/day: 0.00     Average packs/day: 0.5 packs/day for 10.0 years (5.0 ttl pk-yrs)     Types: Cigarettes     Start date:      Quit date: 2008     Years since quittin.3    Smokeless tobacco: Never    Tobacco comments:     TRANSITIONED TO [facility] APPROXIMATELY 10 YEARS AGO.   Vaping Use    Vaping status: Every Day    Substances: Nicotine   Substance and Sexual Activity    Alcohol use: No    Drug use: No         ALLERGIES  Ciprofloxacin and Penicillins        REVIEW OF SYSTEMS    All systems reviewed and negative except for those discussed in HPI.       PHYSICAL  EXAM    I have reviewed the triage vital signs and nursing notes.    ED Triage Vitals [04/27/24 0503]   Temp Heart Rate Resp BP SpO2   98 °F (36.7 °C) 84 18 (!) 159/110 100 %      Temp src Heart Rate Source Patient Position BP Location FiO2 (%)   Oral Monitor Sitting Left arm --         General: no acute distress, well-appearing, non-toxic  Skin: normal color, warm and dry  Head: normocephalic, atraumatic  Nose: normal nasal mucosa, no visible deformity.  Mouth: moist mucous membranes.  Neck: supple.  Chest: no retractions, no visible deformity  Cardiovascular: Regular rate and rhythm.  Lungs: clear to auscultation bilaterally.  Abdomen: soft, non-tender, non-distended. No rebound tenderness, no guarding.  No peritonitis.  Extremities: Palpable radial pulses bilaterally. Palpable posterior tibial pulses bilaterally. Neuro:  alert and oriented x3, no focal neurological deficits.  5 out of 5 strength about the bilateral lower extremities.  Intact plantar and dorsiflexion of the feet bilaterally.  Psych:  appropriate mood and behavior.        LAB RESULTS  Recent Results (from the past 24 hour(s))   Pregnancy, Urine - Urine, Clean Catch    Collection Time: 04/27/24  5:17 AM    Specimen: Urine, Clean Catch   Result Value Ref Range    HCG, Urine QL Negative Negative       If labs were ordered, I independently reviewed the results and considered them in treating the patient.  See medical decision making discussion section for my interpretation of lab results.        RADIOLOGY  No Radiology Exams Resulted Within Past 24 Hours        PROCEDURES    Procedures    No orders to display       MEDICATIONS GIVEN IN ER    Medications   ketorolac (TORADOL) injection 60 mg (has no administration in time range)   orphenadrine (NORFLEX) injection 60 mg (has no administration in time range)         MEDICAL DECISION MAKING, PROGRESS, and CONSULTS    All labs, if obtained, have been independently reviewed by me.  All radiology studies, if  obtained, have been reviewed by me and the radiologist dictating the report.  All EKG's, if obtained, have been independently viewed and interpreted by me/my attending physician.      Discussion below represents my analysis of pertinent findings related to patient's condition, differential diagnosis, treatment plan and final disposition.                         Differential diagnosis:    Differential diagnoses includes discitis, cellulitis, epidural abscess, epidural hematoma, cauda equina syndrome, sciatica, radicular pain, other acute emergency.    Medical Decision Making Discussion:    Patient vitals are reviewed and are remarkable for elevated blood pressure but otherwise are normal.  Patient informed of hypertension and the need to follow-up closely with primary care.    I doubt acute cord compression she has no red flag signs or symptoms of cauda equina.  She has no risk factors for discitis, osteomyelitis and/or epidural abscess or epidural hematoma.    Patient will be treated conservatively with IM Toradol and Norflex.  She is instructed to follow-up closely with her primary care provider and to return to the emerged department before then for any concerning symptoms, worsening symptoms or new concerns.    Additional sources:    - External (non-ED) record review: OB/GYN H&P from February 2024 documenting history of abnormal uterine bleeding, menorrhagia with irregular cycle, pelvic pain, uterine fibroids.  Patient underwent open supracervical hysterectomy, bilateral salpingectomy and lysis of adhesions.    Shared Decision Making:  After my consideration of clinical presentation and any laboratory/radiology studies obtained, I discussed the findings with the patient/patient representative who is in agreement with the treatment plan and the final disposition.   Risks and benefits of discharge and/or observation/admission were discussed.    Orders placed during this visit:  Orders Placed This Encounter    Procedures    Pregnancy, Urine - Urine, Clean Catch         Additional orders considered but not ordered:  Considered imaging of the lumbar spine however patient has no acute trauma or any risk factors for infective process so imaging was deferred.        AS OF 05:36 EDT VITALS:    BP - (!) 159/110  HR - 84  TEMP - 98 °F (36.7 °C) (Oral)  O2 SATS - 100%                  DIAGNOSIS  Final diagnoses:   Radicular low back pain         DISPOSITION  Discharge      Please note that portions of this document were completed with voice recognition software.        Rob Connolly MD  04/27/24 7649

## 2024-07-02 ENCOUNTER — TRANSCRIBE ORDERS (OUTPATIENT)
Dept: LAB | Facility: HOSPITAL | Age: 44
End: 2024-07-02
Payer: MEDICARE

## 2024-07-02 ENCOUNTER — LAB (OUTPATIENT)
Dept: LAB | Facility: HOSPITAL | Age: 44
End: 2024-07-02
Payer: MEDICARE

## 2024-07-02 DIAGNOSIS — E88.810 DYSMETABOLIC SYNDROME X: ICD-10-CM

## 2024-07-02 DIAGNOSIS — E66.9 OBESITY, UNSPECIFIED CLASSIFICATION, UNSPECIFIED OBESITY TYPE, UNSPECIFIED WHETHER SERIOUS COMORBIDITY PRESENT: ICD-10-CM

## 2024-07-02 DIAGNOSIS — E55.9 AVITAMINOSIS D: ICD-10-CM

## 2024-07-02 DIAGNOSIS — I10 ESSENTIAL HYPERTENSION, MALIGNANT: ICD-10-CM

## 2024-07-02 DIAGNOSIS — Z85.09 PERSONAL HISTORY OF MALIGNANT NEOPLASM OF GALLBLADDER: ICD-10-CM

## 2024-07-02 DIAGNOSIS — Z85.09 PERSONAL HISTORY OF MALIGNANT NEOPLASM OF GALLBLADDER: Primary | ICD-10-CM

## 2024-07-02 LAB
25(OH)D3 SERPL-MCNC: 52.8 NG/ML (ref 30–100)
ALBUMIN SERPL-MCNC: 3.8 G/DL (ref 3.5–5.2)
ALBUMIN/GLOB SERPL: 1.3 G/DL
ALP SERPL-CCNC: 74 U/L (ref 39–117)
ALT SERPL W P-5'-P-CCNC: 26 U/L (ref 1–33)
ANION GAP SERPL CALCULATED.3IONS-SCNC: 11.9 MMOL/L (ref 5–15)
AST SERPL-CCNC: 29 U/L (ref 1–32)
BASOPHILS # BLD AUTO: 0.02 10*3/MM3 (ref 0–0.2)
BASOPHILS NFR BLD AUTO: 0.2 % (ref 0–1.5)
BILIRUB SERPL-MCNC: 0.5 MG/DL (ref 0–1.2)
BUN SERPL-MCNC: 7 MG/DL (ref 6–20)
BUN/CREAT SERPL: 9.5 (ref 7–25)
CALCIUM SPEC-SCNC: 8.7 MG/DL (ref 8.6–10.5)
CHLORIDE SERPL-SCNC: 97 MMOL/L (ref 98–107)
CHOLEST SERPL-MCNC: 134 MG/DL (ref 0–200)
CO2 SERPL-SCNC: 30.1 MMOL/L (ref 22–29)
CREAT SERPL-MCNC: 0.74 MG/DL (ref 0.57–1)
DEPRECATED RDW RBC AUTO: 42.4 FL (ref 37–54)
EGFRCR SERPLBLD CKD-EPI 2021: 102.5 ML/MIN/1.73
EOSINOPHIL # BLD AUTO: 0.35 10*3/MM3 (ref 0–0.4)
EOSINOPHIL NFR BLD AUTO: 4.3 % (ref 0.3–6.2)
ERYTHROCYTE [DISTWIDTH] IN BLOOD BY AUTOMATED COUNT: 14.3 % (ref 12.3–15.4)
ESTRADIOL SERPL HS-MCNC: 199 PG/ML
FSH SERPL-ACNC: 12.5 MIU/ML
GLOBULIN UR ELPH-MCNC: 2.9 GM/DL
GLUCOSE SERPL-MCNC: 131 MG/DL (ref 65–99)
HBA1C MFR BLD: 6 % (ref 4.8–5.6)
HCT VFR BLD AUTO: 41.9 % (ref 34–46.6)
HDLC SERPL-MCNC: 28 MG/DL (ref 40–60)
HGB BLD-MCNC: 13.8 G/DL (ref 12–15.9)
IMM GRANULOCYTES # BLD AUTO: 0.04 10*3/MM3 (ref 0–0.05)
IMM GRANULOCYTES NFR BLD AUTO: 0.5 % (ref 0–0.5)
LDLC SERPL CALC-MCNC: 55 MG/DL (ref 0–100)
LDLC/HDLC SERPL: 1.45 {RATIO}
LH SERPL-ACNC: 11.4 MIU/ML
LYMPHOCYTES # BLD AUTO: 1.6 10*3/MM3 (ref 0.7–3.1)
LYMPHOCYTES NFR BLD AUTO: 19.5 % (ref 19.6–45.3)
MCH RBC QN AUTO: 27.1 PG (ref 26.6–33)
MCHC RBC AUTO-ENTMCNC: 32.9 G/DL (ref 31.5–35.7)
MCV RBC AUTO: 82.2 FL (ref 79–97)
MONOCYTES # BLD AUTO: 0.63 10*3/MM3 (ref 0.1–0.9)
MONOCYTES NFR BLD AUTO: 7.7 % (ref 5–12)
NEUTROPHILS NFR BLD AUTO: 5.58 10*3/MM3 (ref 1.7–7)
NEUTROPHILS NFR BLD AUTO: 67.8 % (ref 42.7–76)
NRBC BLD AUTO-RTO: 0 /100 WBC (ref 0–0.2)
PLATELET # BLD AUTO: 266 10*3/MM3 (ref 140–450)
PMV BLD AUTO: 9.9 FL (ref 6–12)
POTASSIUM SERPL-SCNC: 3.3 MMOL/L (ref 3.5–5.2)
PROT SERPL-MCNC: 6.7 G/DL (ref 6–8.5)
RBC # BLD AUTO: 5.1 10*6/MM3 (ref 3.77–5.28)
SODIUM SERPL-SCNC: 139 MMOL/L (ref 136–145)
TRIGL SERPL-MCNC: 327 MG/DL (ref 0–150)
TSH SERPL DL<=0.05 MIU/L-ACNC: 0.87 UIU/ML (ref 0.27–4.2)
VIT B12 BLD-MCNC: 570 PG/ML (ref 211–946)
VLDLC SERPL-MCNC: 51 MG/DL (ref 5–40)
WBC NRBC COR # BLD AUTO: 8.22 10*3/MM3 (ref 3.4–10.8)

## 2024-07-02 PROCEDURE — 83001 ASSAY OF GONADOTROPIN (FSH): CPT

## 2024-07-02 PROCEDURE — 80053 COMPREHEN METABOLIC PANEL: CPT

## 2024-07-02 PROCEDURE — 36415 COLL VENOUS BLD VENIPUNCTURE: CPT

## 2024-07-02 PROCEDURE — 82607 VITAMIN B-12: CPT

## 2024-07-02 PROCEDURE — 83036 HEMOGLOBIN GLYCOSYLATED A1C: CPT

## 2024-07-02 PROCEDURE — 80061 LIPID PANEL: CPT

## 2024-07-02 PROCEDURE — 85025 COMPLETE CBC W/AUTO DIFF WBC: CPT

## 2024-07-02 PROCEDURE — 82670 ASSAY OF TOTAL ESTRADIOL: CPT

## 2024-07-02 PROCEDURE — 83002 ASSAY OF GONADOTROPIN (LH): CPT

## 2024-07-02 PROCEDURE — 82306 VITAMIN D 25 HYDROXY: CPT

## 2024-07-02 PROCEDURE — 84443 ASSAY THYROID STIM HORMONE: CPT

## 2024-08-22 ENCOUNTER — TRANSCRIBE ORDERS (OUTPATIENT)
Dept: GENERAL RADIOLOGY | Facility: HOSPITAL | Age: 44
End: 2024-08-22
Payer: MEDICARE

## 2024-08-22 ENCOUNTER — HOSPITAL ENCOUNTER (OUTPATIENT)
Dept: GENERAL RADIOLOGY | Facility: HOSPITAL | Age: 44
Discharge: HOME | End: 2024-08-22
Admitting: NURSE PRACTITIONER
Payer: MEDICARE

## 2024-08-22 DIAGNOSIS — M25.522 LEFT ELBOW PAIN: Primary | ICD-10-CM

## 2024-08-22 DIAGNOSIS — M25.522 LEFT ELBOW PAIN: ICD-10-CM

## 2024-08-22 PROCEDURE — 73080 X-RAY EXAM OF ELBOW: CPT

## 2024-08-22 PROCEDURE — 73090 X-RAY EXAM OF FOREARM: CPT

## 2024-10-24 NOTE — TELEPHONE ENCOUNTER
Hub staff attempted to follow warm transfer process and was unsuccessful     Caller: Pao Modi    Relationship to patient: Self    Best call back number: [phone]    Patient is needing: EST PT OF THE OFFICE. LAST SEEN 10/2021. WENT TO  ER ON 2/5/24 AND [facility] ER ON 2/6/24 FOR LOWER ABDOMINAL PAIN. PT TOLD TO FOLLOW UP WITH GYN PROVIDER FOR SOON APPT.      0 Hour(s) 50 Minute(s)

## 2025-01-06 ENCOUNTER — APPOINTMENT (OUTPATIENT)
Dept: GENERAL RADIOLOGY | Facility: HOSPITAL | Age: 45
End: 2025-01-06

## 2025-01-06 ENCOUNTER — HOSPITAL ENCOUNTER (EMERGENCY)
Facility: HOSPITAL | Age: 45
Discharge: HOME | End: 2025-01-06
Attending: STUDENT IN AN ORGANIZED HEALTH CARE EDUCATION/TRAINING PROGRAM | Admitting: STUDENT IN AN ORGANIZED HEALTH CARE EDUCATION/TRAINING PROGRAM

## 2025-01-06 VITALS
TEMPERATURE: 98.4 F | OXYGEN SATURATION: 97 % | HEIGHT: 68 IN | WEIGHT: 200 LBS | RESPIRATION RATE: 18 BRPM | SYSTOLIC BLOOD PRESSURE: 130 MMHG | HEART RATE: 76 BPM | BODY MASS INDEX: 30.31 KG/M2 | DIASTOLIC BLOOD PRESSURE: 84 MMHG

## 2025-01-06 DIAGNOSIS — S82.002A CLOSED NONDISPLACED FRACTURE OF LEFT PATELLA, UNSPECIFIED FRACTURE MORPHOLOGY, INITIAL ENCOUNTER: Primary | ICD-10-CM

## 2025-01-06 PROCEDURE — 99283 EMERGENCY DEPT VISIT LOW MDM: CPT | Performed by: STUDENT IN AN ORGANIZED HEALTH CARE EDUCATION/TRAINING PROGRAM

## 2025-01-06 PROCEDURE — 73562 X-RAY EXAM OF KNEE 3: CPT

## 2025-01-06 RX ORDER — OXYCODONE HYDROCHLORIDE 5 MG/1
5-10 TABLET ORAL EVERY 8 HOURS PRN
Qty: 15 TABLET | Refills: 0 | Status: SHIPPED | OUTPATIENT
Start: 2025-01-06

## 2025-01-06 RX ORDER — OXYCODONE HYDROCHLORIDE 5 MG/1
10 TABLET ORAL ONCE
Status: COMPLETED | OUTPATIENT
Start: 2025-01-06 | End: 2025-01-06

## 2025-01-06 RX ADMIN — OXYCODONE 10 MG: 5 TABLET ORAL at 21:52

## 2025-01-06 NOTE — Clinical Note
[facility] EMERGENCY DEPARTMENT  [address] [address]  Phone: [phone]    Pao Modi was seen and treated in our emergency department on 1/6/2025.  She may return to work on 01/10/2025.         Thank you for choosing [facility].    Joseph Grier MD

## 2025-01-07 NOTE — ED PROVIDER NOTES
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  Reports that she got tripped up and fell at home.  States that she fell onto her left knee.  She denies loss of consciousness striking her head or trauma to any other area.  States that she twisted awkwardly when she landed on the knee and felt a pop.  States that she has had intense pain to the knee since she now presents our emergency department for evaluation.  She denies pain to any other area.  No pain to the hips, no pain to palpation of the other 3 extremities.  Denies any chest pain or shortness of breath.  No preceding medical complaints.      Nurses Notes reviewed and agree, including vitals, allergies, social history and prior medical history.     REVIEW OF SYSTEMS: All systems reviewed and not pertinent unless noted.  Review of Systems   Constitutional:  Negative for activity change, appetite change, chills, fatigue and fever.   HENT:  Negative for rhinorrhea, sinus pressure and sinus pain.    Eyes:  Negative for discharge and itching.   Respiratory:  Negative for cough and shortness of breath.    Cardiovascular:  Negative for chest pain and leg swelling.   Gastrointestinal:  Negative for abdominal distention, abdominal pain, nausea and vomiting.   Endocrine: Negative for cold intolerance and heat intolerance.   Genitourinary:  Negative for decreased urine volume, difficulty urinating, flank pain, frequency, urgency, vaginal bleeding, vaginal discharge and vaginal pain.   Musculoskeletal:  Positive for arthralgias and joint swelling. Negative for gait problem, neck pain and neck stiffness.   Skin:  Negative for color change.   Allergic/Immunologic: Negative for environmental allergies.   Neurological:  Negative for seizures, syncope, facial  asymmetry and speech difficulty.   Psychiatric/Behavioral:  Negative for self-injury and suicidal ideas.        Past Medical History:   Diagnosis Date    Abnormal Pap smear of cervix     ACL injury tear     left knee    ADHD (attention deficit hyperactivity disorder)     Cervical dysplasia     Depression     Fluid retention     left leg and knee    Gallbladder cancer     found incidentally at the time of cholecystectomy.  Treated with chemo x 3 months prior to undergoing definitive liver resection with hepaticojejunoscopy and lymphadenectomy. treated at .     History of fracture     AS A CHILD RIGHT WRIST, RIGHT FOOT    History of transfusion     HPV (human papilloma virus) infection     Hx antineoplastic chemo     Hyperlipidemia     Migraines     Multiple gestation     Panic disorder     Tattoo     Torn meniscus     left       Allergies:    Ciprofloxacin and Penicillins      Past Surgical History:   Procedure Laterality Date     SECTION      X1    CHOLECYSTECTOMY WITH COMMON BILE DUCT EXPLORATION  10/2014    COLONOSCOPY      COLONOSCOPY N/A 2018    Procedure: COLONOSCOPY;  Surgeon: Liz Carcamo MD;  Location: [facility] ENDOSCOPY;  Service: Gastroenterology    COLONOSCOPY N/A 10/16/2023    Procedure: COLONOSCOPY;  Surgeon: Liz Carcamo MD;  Location: [facility] ENDOSCOPY;  Service: Gastroenterology;  Laterality: N/A;    D & C HYSTEROSCOPY ENDOMETRIAL ABLATION      ENDOSCOPY      ERCP      WITH STENT PLACEMENT FOR GALL STONE, PRIOR TO LAP CHOLEY AND DIAGNOSIS OF GB CANCER    HYSTEROSCOPY N/A 2024    Procedure: DIAGNOSTIC HYSTEROSCOPY;  Surgeon: Edgar Chavez MD;  Location: [facility] OR;  Service: Obstetrics/Gynecology;  Laterality: N/A;    LIVER RESECTION      for GB cancer, has hepaticojejunostomy    PORTACATH PLACEMENT      TOTAL LAPAROSCOPIC HYSTERECTOMY N/A 2024    Procedure: DIAGNOSTIC LAPAROSCOPY, CONVERSION TO OPEN SUPRACERVICAL HYSTERECTOMY, BILATERAL SALPINGECTOMY,  "SIGNIFICANT LYSIS OF ADHESIONS, CYSTOSCOPY;  Surgeon: Edgar Chavez MD;  Location: [facility];  Service: Obstetrics/Gynecology;  Laterality: N/A;    TUBAL ABDOMINAL LIGATION      VENOUS ACCESS DEVICE (PORT) REMOVAL      WISDOM TOOTH EXTRACTION           Social History     Socioeconomic History    Marital status:    Tobacco Use    Smoking status: Former     Current packs/day: 0.00     Average packs/day: 0.5 packs/day for 10.0 years (5.0 ttl pk-yrs)     Types: Cigarettes     Start date:      Quit date:      Years since quittin.0    Smokeless tobacco: Never    Tobacco comments:     TRANSITIONED TO [facility] APPROXIMATELY 10 YEARS AGO.   Vaping Use    Vaping status: Every Day    Substances: Nicotine   Substance and Sexual Activity    Alcohol use: No    Drug use: No         Family History   Problem Relation Age of Onset    Breast cancer Sister         DCIS    Heart failure Mother          at 74    Diabetes Mother     Hypertension Mother     Heart failure Father         treated fro SCC anal canal.  at 80.     Prostate cancer Father     Diabetes Father        Objective  Physical Exam:  /84   Pulse 76   Temp 98.4 °F (36.9 °C) (Oral)   Resp 18   Ht 172.7 cm (68\")   Wt 90.7 kg (200 lb)   LMP 02/15/2024   SpO2 97%   BMI 30.41 kg/m²      Physical Exam  Constitutional:       General: She is not in acute distress.     Appearance: Normal appearance. She is not ill-appearing.   HENT:      Head: Normocephalic and atraumatic.      Nose: Nose normal. No congestion or rhinorrhea.      Mouth/Throat:      Mouth: Mucous membranes are dry.      Pharynx: Oropharynx is clear. No oropharyngeal exudate or posterior oropharyngeal erythema.   Eyes:      Extraocular Movements: Extraocular movements intact.      Conjunctiva/sclera: Conjunctivae normal.      Pupils: Pupils are equal, round, and reactive to light.   Cardiovascular:      Rate and Rhythm: Normal rate and regular rhythm.      Pulses: Normal " pulses.      Heart sounds: Normal heart sounds.   Pulmonary:      Effort: Pulmonary effort is normal. No respiratory distress.      Breath sounds: Normal breath sounds.   Abdominal:      General: Abdomen is flat. Bowel sounds are normal. There is no distension.      Palpations: Abdomen is soft.      Tenderness: There is no abdominal tenderness.   Musculoskeletal:         General: Swelling, tenderness and signs of injury present. Normal range of motion.      Cervical back: Normal range of motion and neck supple.      Comments: Swelling and tenderness to the left knee   Skin:     General: Skin is warm and dry.      Capillary Refill: Capillary refill takes less than 2 seconds.   Neurological:      General: No focal deficit present.      Mental Status: She is alert and oriented to person, place, and time. Mental status is at baseline.      Cranial Nerves: No cranial nerve deficit.      Sensory: No sensory deficit.      Motor: No weakness.      Coordination: Coordination normal.   Psychiatric:         Mood and Affect: Mood normal.         Behavior: Behavior normal.         Thought Content: Thought content normal.         Judgment: Judgment normal.         Procedures    ED Course:    ED Course as of 01/07/25 0206   Mon Jan 06, 2025 2237 Plain film of the left knee independently interpreted by me showing patellar fracture [JE]      ED Course User Index  [JE] Joseph Grier MD       Lab Results (last 24 hours)       ** No results found for the last 24 hours. **             No radiology results from the last 24 hrs       MDM      Initial impression of presenting illness: Knee pain    DDX: includes but is not limited to: Tibial plateau fracture, knee dislocation, distal femur fracture, ligamentous injury    Patient arrives stable with vitals interpreted by myself.     Pertinent features from physical exam: Clear to auscultation, regular rate and rhythm, no murmur, nontender to abdominal palpation, patient with left  knee effusion with tenderness palpation on exam.    Initial diagnostic plan: X-ray left knee    Results from initial plan were reviewed and interpreted by me revealing patient with patella fracture seen on plain film    Diagnostic information from other sources: Discussed with EMS on arrival and reviewed past medical records    Interventions / Re-evaluation: Given oxycodone for symptom control, placed in knee immobilizer and given crutches    Results/clinical rationale were discussed with patient at bedside    Consultations/Discussion of results with other physicians: Discussed diagnosis patella fracture is etiology of patient's symptoms.  Have placed in knee immobilizer given crutches and referred to orthopedics for further evaluation and management.  Will prescribe oxycodone for refractory pain over top of patient's chronic pain therapy given her fracture.    Disposition plan: Discharge  -----        Final diagnoses:   Closed nondisplaced fracture of left patella, unspecified fracture morphology, initial encounter          Joseph Grier MD  01/07/25 0208

## 2025-01-07 NOTE — DISCHARGE INSTRUCTIONS
You were evaluated for knee pain.  We got an x-ray which appeared to show a patella fracture.  We have placed you in a knee immobilizer and given you crutches to help with ambulation.  You are now stable for discharge.  We have sent a short course of extra opiate therapy to help with refractory pain given your fracture and would recommend that you follow-up with orthopedics for further management and evaluation.  You are now stable for discharge.

## 2025-07-17 ENCOUNTER — TRANSCRIBE ORDERS (OUTPATIENT)
Dept: ADMINISTRATIVE | Facility: HOSPITAL | Age: 45
End: 2025-07-17
Payer: MEDICAID

## 2025-07-17 DIAGNOSIS — Z12.31 VISIT FOR SCREENING MAMMOGRAM: Primary | ICD-10-CM

## 2025-08-11 ENCOUNTER — HOSPITAL ENCOUNTER (EMERGENCY)
Facility: HOSPITAL | Age: 45
Discharge: HOME | End: 2025-08-11
Attending: STUDENT IN AN ORGANIZED HEALTH CARE EDUCATION/TRAINING PROGRAM | Admitting: STUDENT IN AN ORGANIZED HEALTH CARE EDUCATION/TRAINING PROGRAM
Payer: COMMERCIAL

## 2025-08-11 VITALS
WEIGHT: 210 LBS | SYSTOLIC BLOOD PRESSURE: 183 MMHG | DIASTOLIC BLOOD PRESSURE: 102 MMHG | HEART RATE: 82 BPM | BODY MASS INDEX: 31.83 KG/M2 | RESPIRATION RATE: 20 BRPM | HEIGHT: 68 IN | OXYGEN SATURATION: 98 % | TEMPERATURE: 97.6 F

## 2025-08-11 DIAGNOSIS — T24.201A SECOND DEGREE BURN OF RIGHT LEG, INITIAL ENCOUNTER: Primary | ICD-10-CM

## 2025-08-11 PROCEDURE — 99282 EMERGENCY DEPT VISIT SF MDM: CPT | Performed by: STUDENT IN AN ORGANIZED HEALTH CARE EDUCATION/TRAINING PROGRAM

## 2025-08-12 ENCOUNTER — OFFICE VISIT (OUTPATIENT)
Dept: OBSTETRICS AND GYNECOLOGY | Facility: CLINIC | Age: 45
End: 2025-08-12
Payer: COMMERCIAL

## 2025-08-12 VITALS
SYSTOLIC BLOOD PRESSURE: 130 MMHG | WEIGHT: 215 LBS | DIASTOLIC BLOOD PRESSURE: 88 MMHG | BODY MASS INDEX: 32.58 KG/M2 | HEIGHT: 68 IN

## 2025-08-12 DIAGNOSIS — Z90.711 S/P ABDOMINAL SUPRACERVICAL SUBTOTAL HYSTERECTOMY: ICD-10-CM

## 2025-08-12 DIAGNOSIS — Z90.79 H/O BILATERAL SALPINGECTOMY: ICD-10-CM

## 2025-08-12 DIAGNOSIS — Z01.419 WELL WOMAN EXAM WITH ROUTINE GYNECOLOGICAL EXAM: Primary | ICD-10-CM

## 2025-08-12 DIAGNOSIS — Z12.4 SCREENING FOR MALIGNANT NEOPLASM OF CERVIX: ICD-10-CM

## 2025-08-12 RX ORDER — DEXTROAMPHETAMINE SACCHARATE, AMPHETAMINE ASPARTATE, DEXTROAMPHETAMINE SULFATE AND AMPHETAMINE SULFATE 2.5; 2.5; 2.5; 2.5 MG/1; MG/1; MG/1; MG/1
TABLET ORAL
COMMUNITY
Start: 2025-08-04

## 2025-08-12 RX ORDER — TAZAROTENE 1 MG/G
1 CREAM TOPICAL
COMMUNITY
Start: 2025-04-11

## 2025-08-12 RX ORDER — CLONAZEPAM 1 MG/1
TABLET ORAL
COMMUNITY
Start: 2025-08-04

## 2025-08-12 RX ORDER — DEXTROAMPHETAMINE SACCHARATE, AMPHETAMINE ASPARTATE, DEXTROAMPHETAMINE SULFATE AND AMPHETAMINE SULFATE 5; 5; 5; 5 MG/1; MG/1; MG/1; MG/1
TABLET ORAL
COMMUNITY
Start: 2025-07-23

## 2025-08-12 RX ORDER — TIZANIDINE 2 MG/1
2 TABLET ORAL EVERY 6 HOURS PRN
COMMUNITY
Start: 2025-04-22

## 2025-08-13 LAB — REF LAB TEST METHOD: NORMAL

## (undated) DEVICE — PK LAVH 20

## (undated) DEVICE — SUT VIC 0 CT 36IN UD VCP958H

## (undated) DEVICE — TBG SXN CONN/F UNIV 1/4IN 12FT LF STRL

## (undated) DEVICE — GLV SURG SENSICARE W/ALOE PF LF SZ6 STRL

## (undated) DEVICE — GLV SURG SENSICARE W/ALOE PF LF 6.5 STRL

## (undated) DEVICE — SUT VIC 0 CT 36IN J958H

## (undated) DEVICE — MEDI-VAC NON-CONDUCTIVE SUCTION TUBING: Brand: CARDINAL HEALTH

## (undated) DEVICE — JELLY,LUBE,STERILE,FLIP TOP,TUBE,2-OZ: Brand: MEDLINE

## (undated) DEVICE — ST TBG PNEUMOCLEAR EVAC SMOKE HIFLO

## (undated) DEVICE — SLV TROC ENDOPATHXCEL THRD 5X100MM CB5LT

## (undated) DEVICE — MANIP UTER RUMI TP 6.7MMX10CM GRN

## (undated) DEVICE — TROC BLADLES XCEL/OPTI SLV THRD 11X100

## (undated) DEVICE — MANIFLD WAST MGMT SYS NEPTUNE2 4PT

## (undated) DEVICE — SYR LL TP 10ML STRL

## (undated) DEVICE — SUT PDS 0 CT 36IN VIO PDP358T

## (undated) DEVICE — DEV TISS REMOV MYOSURE REACH

## (undated) DEVICE — HDRST POSTN SLOTTED A/

## (undated) DEVICE — MARKR SKIN W/RULR

## (undated) DEVICE — SEAL HYSTERSCOPE/OUTFLOW CHANNEL MYOSURE

## (undated) DEVICE — STPLR SKIN SUBCUTICULAR INSORB 2030

## (undated) DEVICE — YANKAUER,BULB TIP, NO VENT: Brand: ARGYLE

## (undated) DEVICE — PAD GRND E/S MEGADYNE MONOPLR 2/PLT W/15FT/CORD A/ DISP

## (undated) DEVICE — ST IRR CYSTO W/SPK 77IN LF

## (undated) DEVICE — ADHS LIQ MASTISOL 2/3ML

## (undated) DEVICE — GLV SURG SENSICARE PI LF PF 7.5 GRN STRL

## (undated) DEVICE — SYR IRR TOOMEY W/THMB/RNG 70CC STRL

## (undated) DEVICE — DRP ADAPT ALLY UTER POSTN SYS 1P/U

## (undated) DEVICE — TROC BLADLES XCEL/OPTI SLV THRD 5X100

## (undated) DEVICE — SYR LUERLOK 50ML

## (undated) DEVICE — SOL IRR H2O BTL 1000ML STRL

## (undated) DEVICE — CATH FOLEY LUBRICATH 3WY 18F 30CC

## (undated) DEVICE — VLV SXN AIR/H2O ORCAPOD3 1P/U STRL

## (undated) DEVICE — CATH FOL SIL 2WY 16F 10CC LF

## (undated) DEVICE — Device

## (undated) DEVICE — TBG CONN OUTFLOW AQUILEX/MYOSURE

## (undated) DEVICE — SXN/IRR STRYKEFLOW2 10FT

## (undated) DEVICE — SOL IRR NACL 0.9PCT BT 1000ML

## (undated) DEVICE — DRAPE UNDERBUTTOCKS W FLUID POUCH 40X44IN

## (undated) DEVICE — STRIP CLS WND CURAD MEDI/STRIP HYPOALLERG 0.5X4IN STRL PK/6

## (undated) DEVICE — PAD STEEP TRENDELENBURG W/RAIL STRAP INTEGR ARM PROTECT WING

## (undated) DEVICE — TBG CONN INFLOW AQUILEX/MYOSURE

## (undated) DEVICE — SUTURE GUT CHROMIC 3/0 912H

## (undated) DEVICE — LUBE JELLY PK/2.75GM STRL BX/144

## (undated) DEVICE — SUT MNCRYL PLS ANTIB UD 3/0 PS2 27IN

## (undated) DEVICE — MANIP UTER ARCH KOHEFFICIENT 3.5CM

## (undated) DEVICE — GLV SURG BIOGEL PI ULTRATOUCH G SZ7.5 LF

## (undated) DEVICE — PK MINOR LITHO 20

## (undated) DEVICE — SPNG LAP 18X18IN LF STRL PK/5

## (undated) DEVICE — PLUG CATH FOL W/CAP STRL

## (undated) DEVICE — SLV SCD CALF HEMOFORCE DVT THERP REPROC MD

## (undated) DEVICE — MANIP UTER ARCH KOHEFFICIENT 4.0CM